# Patient Record
Sex: FEMALE | Race: WHITE | NOT HISPANIC OR LATINO
[De-identification: names, ages, dates, MRNs, and addresses within clinical notes are randomized per-mention and may not be internally consistent; named-entity substitution may affect disease eponyms.]

---

## 2017-03-29 ENCOUNTER — APPOINTMENT (OUTPATIENT)
Dept: DERMATOLOGY | Facility: CLINIC | Age: 62
End: 2017-03-29

## 2017-03-29 DIAGNOSIS — Z86.19 PERSONAL HISTORY OF OTHER INFECTIOUS AND PARASITIC DISEASES: ICD-10-CM

## 2017-03-29 DIAGNOSIS — Z78.9 OTHER SPECIFIED HEALTH STATUS: ICD-10-CM

## 2017-03-29 DIAGNOSIS — Z85.828 PERSONAL HISTORY OF OTHER MALIGNANT NEOPLASM OF SKIN: ICD-10-CM

## 2017-03-29 DIAGNOSIS — Z80.9 FAMILY HISTORY OF MALIGNANT NEOPLASM, UNSPECIFIED: ICD-10-CM

## 2017-03-29 DIAGNOSIS — L57.0 ACTINIC KERATOSIS: ICD-10-CM

## 2017-10-04 ENCOUNTER — APPOINTMENT (OUTPATIENT)
Dept: DERMATOLOGY | Facility: CLINIC | Age: 62
End: 2017-10-04

## 2018-01-18 ENCOUNTER — APPOINTMENT (OUTPATIENT)
Dept: DERMATOLOGY | Facility: CLINIC | Age: 63
End: 2018-01-18
Payer: COMMERCIAL

## 2018-01-18 DIAGNOSIS — L81.4 OTHER MELANIN HYPERPIGMENTATION: ICD-10-CM

## 2018-01-18 DIAGNOSIS — L82.0 INFLAMED SEBORRHEIC KERATOSIS: ICD-10-CM

## 2018-01-18 DIAGNOSIS — Z00.00 ENCOUNTER FOR GENERAL ADULT MEDICAL EXAMINATION W/OUT ABNORMAL FINDINGS: ICD-10-CM

## 2018-01-18 DIAGNOSIS — L82.1 OTHER SEBORRHEIC KERATOSIS: ICD-10-CM

## 2018-01-18 DIAGNOSIS — D18.01 HEMANGIOMA OF SKIN AND SUBCUTANEOUS TISSUE: ICD-10-CM

## 2018-01-18 PROCEDURE — 17110 DESTRUCTION B9 LES UP TO 14: CPT

## 2018-01-18 PROCEDURE — 99213 OFFICE O/P EST LOW 20 MIN: CPT | Mod: 25

## 2018-07-27 PROBLEM — Z78.9 ALCOHOL USE: Status: ACTIVE | Noted: 2017-03-29

## 2018-07-27 PROBLEM — Z85.828 HISTORY OF BASAL CELL CARCINOMA: Status: RESOLVED | Noted: 2017-03-29 | Resolved: 2018-07-27

## 2022-12-02 ENCOUNTER — OFFICE (OUTPATIENT)
Dept: URBAN - METROPOLITAN AREA CLINIC 109 | Facility: CLINIC | Age: 67
Setting detail: OPHTHALMOLOGY
End: 2022-12-02
Payer: MEDICARE

## 2022-12-02 DIAGNOSIS — H25.11: ICD-10-CM

## 2022-12-02 DIAGNOSIS — H25.13: ICD-10-CM

## 2022-12-02 PROCEDURE — 92136 OPHTHALMIC BIOMETRY: CPT | Performed by: OPHTHALMOLOGY

## 2022-12-02 PROCEDURE — 99213 OFFICE O/P EST LOW 20 MIN: CPT | Performed by: OPHTHALMOLOGY

## 2022-12-02 ASSESSMENT — KERATOMETRY
OD_K2POWER_DIOPTERS: 43.75
OD_AXISANGLE2_DEGREES: 134
OD_AXISANGLE_DEGREES: 74
OS_K2POWER_DIOPTERS: 43.50
OS_K1POWER_DIOPTERS: 42.87
OD_K1K2_AVERAGE: 42.75
OS_K1K2_AVERAGE: 43.185
OD_K1POWER_DIOPTERS: 43.00
OS_AXISANGLE_DEGREES: 002
OS_AXISANGLE2_DEGREES: 002
OS_K2POWER_DIOPTERS: 43.50
OD_CYLAXISANGLE_DEGREES: 134
OS_CYLAXISANGLE_DEGREES: 002
OD_K2POWER_DIOPTERS: 43.87
OD_CYLPOWER_DEGREES: 2
OD_K1POWER_DIOPTERS: 41.75
OS_AXISANGLE_DEGREES: 92
OS_K1POWER_DIOPTERS: 42.87
OS_CYLPOWER_DEGREES: 0.63
OD_AXISANGLE_DEGREES: 44

## 2022-12-02 ASSESSMENT — REFRACTION_AUTOREFRACTION
OD_SPHERE: -1.50
OS_CYLINDER: -0.25
OS_AXIS: 138
OD_CYLINDER: -0.50
OS_SPHERE: -0.25
OD_AXIS: 030

## 2022-12-02 ASSESSMENT — SPHEQUIV_DERIVED
OD_SPHEQUIV: -2.25
OD_SPHEQUIV: -1.75
OD_SPHEQUIV: -2.75
OS_SPHEQUIV: -0.875
OS_SPHEQUIV: -0.375

## 2022-12-02 ASSESSMENT — REFRACTION_CURRENTRX
OD_CYLINDER: -1.00
OD_SPHERE: -2.50
OD_AXIS: 155
OS_AXIS: 95
OD_OVR_VA: 20/
OS_SPHERE: -2.25
OS_CYLINDER: -0.25
OS_OVR_VA: 20/
OS_OVR_VA: 20/
OD_CYLINDER: -0.50
OS_AXIS: 125
OS_SPHERE: -1.50
OD_AXIS: 21
OS_CYLINDER: -0.75
OD_SPHERE: -1.25
OD_OVR_VA: 20/

## 2022-12-02 ASSESSMENT — REFRACTION_MANIFEST
OS_AXIS: 89
OS_SPHERE: -0.25
OS_CYLINDER: -1.25
OD_AXIS: 155
OD_CYLINDER: -0.50
OD_AXIS: 18
OD_SPHERE: -2.50
OD_VA1: 20/NI
OD_SPHERE: -2.00
OD_ADD: +2.50
OD_CYLINDER: -0.50
OS_ADD: +2.50

## 2022-12-02 ASSESSMENT — AXIALLENGTH_DERIVED
OD_AL: 25.0203
OS_AL: 24.0575
OD_AL: 24.8031
OD_AL: 24.5897
OS_AL: 23.8566

## 2022-12-02 ASSESSMENT — CONFRONTATIONAL VISUAL FIELD TEST (CVF)
OS_FINDINGS: FULL
OD_FINDINGS: FULL

## 2022-12-02 ASSESSMENT — VISUAL ACUITY
OD_BCVA: 20/20
OS_BCVA: 20/50-2

## 2022-12-02 ASSESSMENT — SUPERFICIAL PUNCTATE KERATITIS (SPK)
OS_SPK: 1+
OD_SPK: 1+

## 2022-12-02 ASSESSMENT — CORNEAL DYSTROPHY - POSTERIOR
OD_POSTERIORDYSTROPHY: PPD FUCHS
OS_POSTERIORDYSTROPHY: PPD FUCHS

## 2022-12-13 ENCOUNTER — AMBULATORY SURGERY CENTER (OUTPATIENT)
Dept: URBAN - METROPOLITAN AREA SURGERY 19 | Facility: SURGERY | Age: 67
Setting detail: OPHTHALMOLOGY
End: 2022-12-13
Payer: MEDICARE

## 2022-12-13 DIAGNOSIS — H52.221: ICD-10-CM

## 2022-12-13 DIAGNOSIS — H25.11: ICD-10-CM

## 2022-12-13 PROCEDURE — 66984 XCAPSL CTRC RMVL W/O ECP: CPT | Performed by: OPHTHALMOLOGY

## 2022-12-13 PROCEDURE — A9270 NON-COVERED ITEM OR SERVICE: HCPCS | Performed by: OPHTHALMOLOGY

## 2022-12-13 PROCEDURE — FEMTO CATARACT LASER: Performed by: OPHTHALMOLOGY

## 2022-12-14 ENCOUNTER — RX ONLY (RX ONLY)
Age: 67
End: 2022-12-14

## 2022-12-14 ENCOUNTER — OFFICE (OUTPATIENT)
Dept: URBAN - METROPOLITAN AREA CLINIC 109 | Facility: CLINIC | Age: 67
Setting detail: OPHTHALMOLOGY
End: 2022-12-14
Payer: MEDICARE

## 2022-12-14 DIAGNOSIS — Z96.1: ICD-10-CM

## 2022-12-14 PROCEDURE — 99024 POSTOP FOLLOW-UP VISIT: CPT | Performed by: OPHTHALMOLOGY

## 2022-12-14 ASSESSMENT — REFRACTION_CURRENTRX
OS_OVR_VA: 20/
OD_CYLINDER: -1.00
OS_AXIS: 125
OD_OVR_VA: 20/
OD_CYLINDER: -0.50
OD_AXIS: 155
OS_SPHERE: -2.25
OD_SPHERE: -1.25
OS_CYLINDER: -0.75
OS_OVR_VA: 20/
OS_AXIS: 95
OD_AXIS: 21
OS_SPHERE: -1.50
OD_OVR_VA: 20/
OS_CYLINDER: -0.25
OD_SPHERE: -2.50

## 2022-12-14 ASSESSMENT — REFRACTION_MANIFEST
OD_AXIS: 155
OD_VA1: 20/NI
OD_SPHERE: -2.50
OD_AXIS: 18
OS_SPHERE: -0.25
OD_SPHERE: -2.00
OS_CYLINDER: -1.25
OS_ADD: +2.50
OS_AXIS: 89
OD_ADD: +2.50
OD_CYLINDER: -0.50
OD_CYLINDER: -0.50

## 2022-12-14 ASSESSMENT — KERATOMETRY
OS_K1POWER_DIOPTERS: 42.87
OS_K2POWER_DIOPTERS: 43.50
OS_AXISANGLE_DEGREES: 002
OD_K2POWER_DIOPTERS: 43.87
OD_K1POWER_DIOPTERS: 43.00
OD_AXISANGLE_DEGREES: 74

## 2022-12-14 ASSESSMENT — REFRACTION_AUTOREFRACTION
OS_CYLINDER: -0.25
OD_AXIS: 030
OD_SPHERE: -1.50
OD_CYLINDER: -0.50
OS_SPHERE: -0.25
OS_AXIS: 138

## 2022-12-14 ASSESSMENT — SPHEQUIV_DERIVED
OS_SPHEQUIV: -0.375
OS_SPHEQUIV: -0.875
OD_SPHEQUIV: -2.25
OD_SPHEQUIV: -1.75
OD_SPHEQUIV: -2.75

## 2022-12-14 ASSESSMENT — VISUAL ACUITY
OD_BCVA: 20/20
OS_BCVA: 20/20-1

## 2022-12-14 ASSESSMENT — CONFRONTATIONAL VISUAL FIELD TEST (CVF)
OS_FINDINGS: FULL
OD_FINDINGS: FULL

## 2022-12-14 ASSESSMENT — SUPERFICIAL PUNCTATE KERATITIS (SPK)
OD_SPK: 1+
OS_SPK: 1+

## 2022-12-14 ASSESSMENT — AXIALLENGTH_DERIVED
OD_AL: 24.53
OD_AL: 24.74
OS_AL: 23.8566
OS_AL: 24.0575
OD_AL: 24.32

## 2023-01-09 ENCOUNTER — OFFICE (OUTPATIENT)
Dept: URBAN - METROPOLITAN AREA CLINIC 109 | Facility: CLINIC | Age: 68
Setting detail: OPHTHALMOLOGY
End: 2023-01-09
Payer: MEDICARE

## 2023-01-09 DIAGNOSIS — Z96.1: ICD-10-CM

## 2023-01-09 PROBLEM — H25.12 CATARACT SENILE NUCLEAR SCLEROSIS;  , LEFT EYE: Status: ACTIVE | Noted: 2022-12-14

## 2023-01-09 PROCEDURE — 99024 POSTOP FOLLOW-UP VISIT: CPT | Performed by: OPHTHALMOLOGY

## 2023-01-09 ASSESSMENT — KERATOMETRY
OS_K1POWER_DIOPTERS: 42.87
OD_AXISANGLE_DEGREES: 74
OS_AXISANGLE_DEGREES: 002
OD_K1POWER_DIOPTERS: 43.00
OD_K2POWER_DIOPTERS: 43.87
OS_K2POWER_DIOPTERS: 43.50

## 2023-01-09 ASSESSMENT — REFRACTION_CURRENTRX
OD_CYLINDER: -0.50
OD_AXIS: 21
OD_SPHERE: -2.50
OS_AXIS: 95
OS_OVR_VA: 20/
OD_AXIS: 155
OD_CYLINDER: -1.00
OD_OVR_VA: 20/
OS_CYLINDER: -0.25
OS_OVR_VA: 20/
OS_AXIS: 125
OS_CYLINDER: -0.75
OD_SPHERE: -1.25
OS_SPHERE: -1.50
OD_OVR_VA: 20/
OS_SPHERE: -2.25

## 2023-01-09 ASSESSMENT — REFRACTION_MANIFEST
OD_AXIS: 155
OS_ADD: +2.50
OS_AXIS: 89
OD_CYLINDER: -0.50
OD_SPHERE: -2.00
OD_CYLINDER: -0.50
OD_ADD: +2.50
OD_VA1: 20/NI
OS_SPHERE: -0.25
OS_CYLINDER: -1.25
OD_SPHERE: -2.50
OD_AXIS: 18

## 2023-01-09 ASSESSMENT — CONFRONTATIONAL VISUAL FIELD TEST (CVF)
OD_FINDINGS: FULL
OS_FINDINGS: FULL

## 2023-01-09 ASSESSMENT — SPHEQUIV_DERIVED
OS_SPHEQUIV: -1.125
OD_SPHEQUIV: -2.75
OD_SPHEQUIV: 0.125
OD_SPHEQUIV: -2.25
OS_SPHEQUIV: -0.875

## 2023-01-09 ASSESSMENT — SUPERFICIAL PUNCTATE KERATITIS (SPK)
OS_SPK: 1+
OD_SPK: 1+

## 2023-01-09 ASSESSMENT — REFRACTION_AUTOREFRACTION
OD_SPHERE: +0.25
OD_AXIS: 58
OS_SPHERE: -0.50
OS_CYLINDER: -1.25
OS_AXIS: 6
OD_CYLINDER: -0.25

## 2023-01-09 ASSESSMENT — VISUAL ACUITY
OS_BCVA: 20/20-1
OD_BCVA: 20/20-1

## 2023-01-09 ASSESSMENT — AXIALLENGTH_DERIVED
OS_AL: 24.1592
OS_AL: 24.0575
OD_AL: 24.74
OD_AL: 23.5671
OD_AL: 24.53

## 2023-04-10 ENCOUNTER — OFFICE (OUTPATIENT)
Dept: URBAN - METROPOLITAN AREA CLINIC 109 | Facility: CLINIC | Age: 68
Setting detail: OPHTHALMOLOGY
End: 2023-04-10
Payer: MEDICARE

## 2023-04-10 ENCOUNTER — RX ONLY (RX ONLY)
Age: 68
End: 2023-04-10

## 2023-04-10 DIAGNOSIS — H25.12: ICD-10-CM

## 2023-04-10 DIAGNOSIS — D31.32: ICD-10-CM

## 2023-04-10 DIAGNOSIS — H26.491: ICD-10-CM

## 2023-04-10 DIAGNOSIS — H47.322: ICD-10-CM

## 2023-04-10 DIAGNOSIS — H16.223: ICD-10-CM

## 2023-04-10 DIAGNOSIS — H35.54: ICD-10-CM

## 2023-04-10 DIAGNOSIS — H35.40: ICD-10-CM

## 2023-04-10 PROCEDURE — 99213 OFFICE O/P EST LOW 20 MIN: CPT | Performed by: OPHTHALMOLOGY

## 2023-04-10 ASSESSMENT — SPHEQUIV_DERIVED
OD_SPHEQUIV: -2.75
OS_SPHEQUIV: -0.25
OD_SPHEQUIV: -0.125
OD_SPHEQUIV: -2.25
OS_SPHEQUIV: -0.875

## 2023-04-10 ASSESSMENT — REFRACTION_MANIFEST
OS_CYLINDER: -1.25
OD_AXIS: 18
OS_ADD: +2.50
OS_SPHERE: -0.25
OD_AXIS: 155
OD_ADD: +2.50
OS_AXIS: 89
OD_CYLINDER: -0.50
OD_SPHERE: -2.50
OD_CYLINDER: -0.50
OD_VA1: 20/NI
OD_SPHERE: -2.00

## 2023-04-10 ASSESSMENT — REFRACTION_CURRENTRX
OS_OVR_VA: 20/
OS_CYLINDER: -0.25
OS_SPHERE: -2.25
OD_OVR_VA: 20/
OS_OVR_VA: 20/
OD_OVR_VA: 20/
OS_AXIS: 95
OS_SPHERE: -1.50
OD_CYLINDER: -0.50
OS_CYLINDER: -0.75
OD_CYLINDER: -1.00
OD_AXIS: 155
OD_SPHERE: -1.25
OD_AXIS: 21
OD_SPHERE: -2.50
OS_AXIS: 125

## 2023-04-10 ASSESSMENT — AXIALLENGTH_DERIVED
OD_AL: 24.53
OD_AL: 23.6647
OS_AL: 23.8069
OS_AL: 24.0575
OD_AL: 24.74

## 2023-04-10 ASSESSMENT — KERATOMETRY
OS_K1POWER_DIOPTERS: 42.87
OD_AXISANGLE_DEGREES: 74
OD_K2POWER_DIOPTERS: 43.87
OD_K1POWER_DIOPTERS: 43.00
OS_AXISANGLE_DEGREES: 002
OS_K2POWER_DIOPTERS: 43.50

## 2023-04-10 ASSESSMENT — CONFRONTATIONAL VISUAL FIELD TEST (CVF)
OS_FINDINGS: FULL
OD_FINDINGS: FULL

## 2023-04-10 ASSESSMENT — SUPERFICIAL PUNCTATE KERATITIS (SPK)
OD_SPK: 1+
OS_SPK: 1+

## 2023-04-10 ASSESSMENT — REFRACTION_AUTOREFRACTION
OS_AXIS: 47
OD_SPHERE: 0.00
OD_AXIS: 6
OD_CYLINDER: -0.25
OS_CYLINDER: -0.50
OS_SPHERE: 0.00

## 2023-04-10 ASSESSMENT — VISUAL ACUITY
OS_BCVA: 20/20
OD_BCVA: 20/20

## 2023-04-10 ASSESSMENT — TONOMETRY
OS_IOP_MMHG: 19
OD_IOP_MMHG: 19

## 2024-01-03 ENCOUNTER — INPATIENT (INPATIENT)
Facility: HOSPITAL | Age: 69
LOS: 0 days | Discharge: HOME | DRG: 395 | End: 2024-01-04
Attending: SURGERY | Admitting: SURGERY
Payer: COMMERCIAL

## 2024-01-03 VITALS
RESPIRATION RATE: 16 BRPM | HEIGHT: 62 IN | WEIGHT: 160.94 LBS | HEART RATE: 64 BPM | SYSTOLIC BLOOD PRESSURE: 149 MMHG | TEMPERATURE: 96 F | DIASTOLIC BLOOD PRESSURE: 107 MMHG | OXYGEN SATURATION: 97 %

## 2024-01-03 DIAGNOSIS — Z98.891 HISTORY OF UTERINE SCAR FROM PREVIOUS SURGERY: Chronic | ICD-10-CM

## 2024-01-03 DIAGNOSIS — K35.80 UNSPECIFIED ACUTE APPENDICITIS: ICD-10-CM

## 2024-01-03 LAB
ALBUMIN SERPL ELPH-MCNC: 4.3 G/DL — SIGNIFICANT CHANGE UP (ref 3.3–5)
ALBUMIN SERPL ELPH-MCNC: 4.3 G/DL — SIGNIFICANT CHANGE UP (ref 3.3–5)
ALP SERPL-CCNC: 76 U/L — SIGNIFICANT CHANGE UP (ref 40–120)
ALP SERPL-CCNC: 76 U/L — SIGNIFICANT CHANGE UP (ref 40–120)
ALT FLD-CCNC: 43 U/L — SIGNIFICANT CHANGE UP (ref 12–78)
ALT FLD-CCNC: 43 U/L — SIGNIFICANT CHANGE UP (ref 12–78)
ANION GAP SERPL CALC-SCNC: 9 MMOL/L — SIGNIFICANT CHANGE UP (ref 5–17)
ANION GAP SERPL CALC-SCNC: 9 MMOL/L — SIGNIFICANT CHANGE UP (ref 5–17)
AST SERPL-CCNC: 25 U/L — SIGNIFICANT CHANGE UP (ref 15–37)
AST SERPL-CCNC: 25 U/L — SIGNIFICANT CHANGE UP (ref 15–37)
BASOPHILS # BLD AUTO: 0.03 K/UL — SIGNIFICANT CHANGE UP (ref 0–0.2)
BASOPHILS # BLD AUTO: 0.03 K/UL — SIGNIFICANT CHANGE UP (ref 0–0.2)
BASOPHILS NFR BLD AUTO: 0.3 % — SIGNIFICANT CHANGE UP (ref 0–2)
BASOPHILS NFR BLD AUTO: 0.3 % — SIGNIFICANT CHANGE UP (ref 0–2)
BILIRUB SERPL-MCNC: 0.7 MG/DL — SIGNIFICANT CHANGE UP (ref 0.2–1.2)
BILIRUB SERPL-MCNC: 0.7 MG/DL — SIGNIFICANT CHANGE UP (ref 0.2–1.2)
BUN SERPL-MCNC: 9 MG/DL — SIGNIFICANT CHANGE UP (ref 7–23)
BUN SERPL-MCNC: 9 MG/DL — SIGNIFICANT CHANGE UP (ref 7–23)
CALCIUM SERPL-MCNC: 9 MG/DL — SIGNIFICANT CHANGE UP (ref 8.5–10.1)
CALCIUM SERPL-MCNC: 9 MG/DL — SIGNIFICANT CHANGE UP (ref 8.5–10.1)
CHLORIDE SERPL-SCNC: 101 MMOL/L — SIGNIFICANT CHANGE UP (ref 96–108)
CHLORIDE SERPL-SCNC: 101 MMOL/L — SIGNIFICANT CHANGE UP (ref 96–108)
CO2 SERPL-SCNC: 25 MMOL/L — SIGNIFICANT CHANGE UP (ref 22–31)
CO2 SERPL-SCNC: 25 MMOL/L — SIGNIFICANT CHANGE UP (ref 22–31)
CREAT SERPL-MCNC: 0.7 MG/DL — SIGNIFICANT CHANGE UP (ref 0.5–1.3)
CREAT SERPL-MCNC: 0.7 MG/DL — SIGNIFICANT CHANGE UP (ref 0.5–1.3)
EGFR: 95 ML/MIN/1.73M2 — SIGNIFICANT CHANGE UP
EGFR: 95 ML/MIN/1.73M2 — SIGNIFICANT CHANGE UP
EOSINOPHIL # BLD AUTO: 0.01 K/UL — SIGNIFICANT CHANGE UP (ref 0–0.5)
EOSINOPHIL # BLD AUTO: 0.01 K/UL — SIGNIFICANT CHANGE UP (ref 0–0.5)
EOSINOPHIL NFR BLD AUTO: 0.1 % — SIGNIFICANT CHANGE UP (ref 0–6)
EOSINOPHIL NFR BLD AUTO: 0.1 % — SIGNIFICANT CHANGE UP (ref 0–6)
GLUCOSE SERPL-MCNC: 155 MG/DL — HIGH (ref 70–99)
GLUCOSE SERPL-MCNC: 155 MG/DL — HIGH (ref 70–99)
HCT VFR BLD CALC: 52.4 % — HIGH (ref 34.5–45)
HCT VFR BLD CALC: 52.4 % — HIGH (ref 34.5–45)
HGB BLD-MCNC: 18.2 G/DL — HIGH (ref 11.5–15.5)
HGB BLD-MCNC: 18.2 G/DL — HIGH (ref 11.5–15.5)
IMM GRANULOCYTES NFR BLD AUTO: 0.4 % — SIGNIFICANT CHANGE UP (ref 0–0.9)
IMM GRANULOCYTES NFR BLD AUTO: 0.4 % — SIGNIFICANT CHANGE UP (ref 0–0.9)
LYMPHOCYTES # BLD AUTO: 0.5 K/UL — LOW (ref 1–3.3)
LYMPHOCYTES # BLD AUTO: 0.5 K/UL — LOW (ref 1–3.3)
LYMPHOCYTES # BLD AUTO: 5 % — LOW (ref 13–44)
LYMPHOCYTES # BLD AUTO: 5 % — LOW (ref 13–44)
MCHC RBC-ENTMCNC: 33 PG — SIGNIFICANT CHANGE UP (ref 27–34)
MCHC RBC-ENTMCNC: 33 PG — SIGNIFICANT CHANGE UP (ref 27–34)
MCHC RBC-ENTMCNC: 34.7 GM/DL — SIGNIFICANT CHANGE UP (ref 32–36)
MCHC RBC-ENTMCNC: 34.7 GM/DL — SIGNIFICANT CHANGE UP (ref 32–36)
MCV RBC AUTO: 95.1 FL — SIGNIFICANT CHANGE UP (ref 80–100)
MCV RBC AUTO: 95.1 FL — SIGNIFICANT CHANGE UP (ref 80–100)
MONOCYTES # BLD AUTO: 0.2 K/UL — SIGNIFICANT CHANGE UP (ref 0–0.9)
MONOCYTES # BLD AUTO: 0.2 K/UL — SIGNIFICANT CHANGE UP (ref 0–0.9)
MONOCYTES NFR BLD AUTO: 2 % — SIGNIFICANT CHANGE UP (ref 2–14)
MONOCYTES NFR BLD AUTO: 2 % — SIGNIFICANT CHANGE UP (ref 2–14)
NEUTROPHILS # BLD AUTO: 9.18 K/UL — HIGH (ref 1.8–7.4)
NEUTROPHILS # BLD AUTO: 9.18 K/UL — HIGH (ref 1.8–7.4)
NEUTROPHILS NFR BLD AUTO: 92.2 % — HIGH (ref 43–77)
NEUTROPHILS NFR BLD AUTO: 92.2 % — HIGH (ref 43–77)
NRBC # BLD: 0 /100 WBCS — SIGNIFICANT CHANGE UP (ref 0–0)
NRBC # BLD: 0 /100 WBCS — SIGNIFICANT CHANGE UP (ref 0–0)
PLATELET # BLD AUTO: 362 K/UL — SIGNIFICANT CHANGE UP (ref 150–400)
PLATELET # BLD AUTO: 362 K/UL — SIGNIFICANT CHANGE UP (ref 150–400)
POTASSIUM SERPL-MCNC: 3.8 MMOL/L — SIGNIFICANT CHANGE UP (ref 3.5–5.3)
POTASSIUM SERPL-MCNC: 3.8 MMOL/L — SIGNIFICANT CHANGE UP (ref 3.5–5.3)
POTASSIUM SERPL-SCNC: 3.8 MMOL/L — SIGNIFICANT CHANGE UP (ref 3.5–5.3)
POTASSIUM SERPL-SCNC: 3.8 MMOL/L — SIGNIFICANT CHANGE UP (ref 3.5–5.3)
PROT SERPL-MCNC: 8.9 G/DL — HIGH (ref 6–8.3)
PROT SERPL-MCNC: 8.9 G/DL — HIGH (ref 6–8.3)
RBC # BLD: 5.51 M/UL — HIGH (ref 3.8–5.2)
RBC # BLD: 5.51 M/UL — HIGH (ref 3.8–5.2)
RBC # FLD: 12.8 % — SIGNIFICANT CHANGE UP (ref 10.3–14.5)
RBC # FLD: 12.8 % — SIGNIFICANT CHANGE UP (ref 10.3–14.5)
SODIUM SERPL-SCNC: 135 MMOL/L — SIGNIFICANT CHANGE UP (ref 135–145)
SODIUM SERPL-SCNC: 135 MMOL/L — SIGNIFICANT CHANGE UP (ref 135–145)
WBC # BLD: 9.96 K/UL — SIGNIFICANT CHANGE UP (ref 3.8–10.5)
WBC # BLD: 9.96 K/UL — SIGNIFICANT CHANGE UP (ref 3.8–10.5)
WBC # FLD AUTO: 9.96 K/UL — SIGNIFICANT CHANGE UP (ref 3.8–10.5)
WBC # FLD AUTO: 9.96 K/UL — SIGNIFICANT CHANGE UP (ref 3.8–10.5)

## 2024-01-03 PROCEDURE — 99285 EMERGENCY DEPT VISIT HI MDM: CPT

## 2024-01-03 PROCEDURE — 71045 X-RAY EXAM CHEST 1 VIEW: CPT | Mod: 26

## 2024-01-03 PROCEDURE — 74177 CT ABD & PELVIS W/CONTRAST: CPT | Mod: 26,MA

## 2024-01-03 PROCEDURE — 99223 1ST HOSP IP/OBS HIGH 75: CPT

## 2024-01-03 RX ORDER — ONDANSETRON 8 MG/1
4 TABLET, FILM COATED ORAL ONCE
Refills: 0 | Status: COMPLETED | OUTPATIENT
Start: 2024-01-03 | End: 2024-01-03

## 2024-01-03 RX ORDER — SODIUM CHLORIDE 9 MG/ML
1000 INJECTION INTRAMUSCULAR; INTRAVENOUS; SUBCUTANEOUS
Refills: 0 | Status: DISCONTINUED | OUTPATIENT
Start: 2024-01-03 | End: 2024-01-04

## 2024-01-03 RX ORDER — LANOLIN ALCOHOL/MO/W.PET/CERES
5 CREAM (GRAM) TOPICAL AT BEDTIME
Refills: 0 | Status: DISCONTINUED | OUTPATIENT
Start: 2024-01-03 | End: 2024-01-04

## 2024-01-03 RX ORDER — SODIUM CHLORIDE 9 MG/ML
1000 INJECTION INTRAMUSCULAR; INTRAVENOUS; SUBCUTANEOUS ONCE
Refills: 0 | Status: COMPLETED | OUTPATIENT
Start: 2024-01-03 | End: 2024-01-03

## 2024-01-03 RX ORDER — AMLODIPINE BESYLATE 2.5 MG/1
10 TABLET ORAL DAILY
Refills: 0 | Status: DISCONTINUED | OUTPATIENT
Start: 2024-01-03 | End: 2024-01-04

## 2024-01-03 RX ORDER — PIPERACILLIN AND TAZOBACTAM 4; .5 G/20ML; G/20ML
3.38 INJECTION, POWDER, LYOPHILIZED, FOR SOLUTION INTRAVENOUS ONCE
Refills: 0 | Status: COMPLETED | OUTPATIENT
Start: 2024-01-03 | End: 2024-01-03

## 2024-01-03 RX ORDER — KETOROLAC TROMETHAMINE 30 MG/ML
15 SYRINGE (ML) INJECTION ONCE
Refills: 0 | Status: DISCONTINUED | OUTPATIENT
Start: 2024-01-03 | End: 2024-01-03

## 2024-01-03 RX ORDER — SENNA PLUS 8.6 MG/1
2 TABLET ORAL AT BEDTIME
Refills: 0 | Status: DISCONTINUED | OUTPATIENT
Start: 2024-01-03 | End: 2024-01-04

## 2024-01-03 RX ORDER — PANTOPRAZOLE SODIUM 20 MG/1
40 TABLET, DELAYED RELEASE ORAL DAILY
Refills: 0 | Status: DISCONTINUED | OUTPATIENT
Start: 2024-01-03 | End: 2024-01-04

## 2024-01-03 RX ORDER — ACETAMINOPHEN 500 MG
650 TABLET ORAL EVERY 6 HOURS
Refills: 0 | Status: DISCONTINUED | OUTPATIENT
Start: 2024-01-03 | End: 2024-01-04

## 2024-01-03 RX ORDER — ONDANSETRON 8 MG/1
4 TABLET, FILM COATED ORAL EVERY 6 HOURS
Refills: 0 | Status: DISCONTINUED | OUTPATIENT
Start: 2024-01-03 | End: 2024-01-04

## 2024-01-03 RX ORDER — ACETAMINOPHEN 500 MG
1000 TABLET ORAL ONCE
Refills: 0 | Status: DISCONTINUED | OUTPATIENT
Start: 2024-01-03 | End: 2024-01-04

## 2024-01-03 RX ADMIN — SODIUM CHLORIDE 1000 MILLILITER(S): 9 INJECTION INTRAMUSCULAR; INTRAVENOUS; SUBCUTANEOUS at 11:37

## 2024-01-03 RX ADMIN — PIPERACILLIN AND TAZOBACTAM 25 GRAM(S): 4; .5 INJECTION, POWDER, LYOPHILIZED, FOR SOLUTION INTRAVENOUS at 17:48

## 2024-01-03 RX ADMIN — Medication 15 MILLIGRAM(S): at 09:52

## 2024-01-03 RX ADMIN — PIPERACILLIN AND TAZOBACTAM 200 GRAM(S): 4; .5 INJECTION, POWDER, LYOPHILIZED, FOR SOLUTION INTRAVENOUS at 16:36

## 2024-01-03 RX ADMIN — SODIUM CHLORIDE 1000 MILLILITER(S): 9 INJECTION INTRAMUSCULAR; INTRAVENOUS; SUBCUTANEOUS at 12:15

## 2024-01-03 RX ADMIN — SODIUM CHLORIDE 100 MILLILITER(S): 9 INJECTION INTRAMUSCULAR; INTRAVENOUS; SUBCUTANEOUS at 17:47

## 2024-01-03 RX ADMIN — SODIUM CHLORIDE 1000 MILLILITER(S): 9 INJECTION INTRAMUSCULAR; INTRAVENOUS; SUBCUTANEOUS at 11:09

## 2024-01-03 RX ADMIN — ONDANSETRON 4 MILLIGRAM(S): 8 TABLET, FILM COATED ORAL at 09:52

## 2024-01-03 RX ADMIN — SODIUM CHLORIDE 1000 MILLILITER(S): 9 INJECTION INTRAMUSCULAR; INTRAVENOUS; SUBCUTANEOUS at 10:30

## 2024-01-03 RX ADMIN — Medication 15 MILLIGRAM(S): at 11:36

## 2024-01-03 NOTE — ED PROVIDER NOTE - NS ED MD DISPO DIVISION
"Last Written Prescription Date:  1/10/18  Last Fill Quantity: 30 tablet,  # refills: 3   Last office visit: 12/1/2017 with prescribing provider:  Michael   Future Office Visit:   Next 5 appointments (look out 90 days)     May 29, 2018 11:00 AM CDT   Return Visit with Susan Lindo MD   [facility] ([facility])    [facility]  [address]  [place] [address]   [phone]                 Requested Prescriptions   Pending Prescriptions Disp Refills     levothyroxine (SYNTHROID/LEVOTHROID) 75 MCG tablet 30 tablet 3     Sig: Take 1 tablet (75 mcg) by mouth daily Recheck TSH in 6 months    Thyroid Protocol Failed    5/26/2018 10:44 AM       Failed - Normal TSH on file in past 12 months    Recent Labs   Lab Test  03/22/18   0948   TSH  4.63*             Passed - Patient is 12 years or older       Passed - Recent (12 mo) or future (30 days) visit within the authorizing provider's specialty    Patient had office visit in the last 12 months or has a visit in the next 30 days with authorizing provider or within the authorizing provider's specialty.  See \"Patient Info\" tab in inbasket, or \"Choose Columns\" in Meds & Orders section of the refill encounter.           Passed - No active pregnancy on record    If patient is pregnant or has had a positive pregnancy test, please check TSH.         Passed - No positive pregnancy test in past 12 months    If patient is pregnant or has had a positive pregnancy test, please check TSH.            " [facility]

## 2024-01-03 NOTE — PATIENT PROFILE ADULT - STATED REASON FOR ADMISSION
[facility] -- Fifi Bliss, Kalpesh Valdovinos, Abraham Sheridan Savella, [place]  Office # [phone]    Follow Up:  SOB, Chest pain, Hx of Afib    Subjective/Observations: Awake and alert, comfortable on RA.  Denies chest pain, palpitations SOB, VALDEZ or orthopnea    REVIEW OF SYSTEMS: All other review of systems is negative unless indicated above  PAST MEDICAL & SURGICAL HISTORY:  HTN (hypertension)  c/b multiple episodes of hypertensive urgency    HLD (hyperlipidemia)    Atrial fibrillation  first documented on EKG 10/7/2021    CAD (coronary artery disease)  s/p stents (not on plavix)    Type 2 diabetes mellitus  not on home insulin/ Meds    Anxiety  Depression  multiple psych medications    History of diverticulitis  07/2021    Diverticulosis  c/b GIB in 2020    Afib  on AC    Stage 3 chronic kidney disease    Anemia of chronic disease  Monoclonal Gammopathy-MGUS  pRBC transfusion 10/15/21    Chronic diastolic congestive heart failure    Multiple thyroid nodules    Blood clots in brain  Had surgery ( April 2013 )    S/P tonsillectomy    S/P arthroscopic knee surgery  Bilateral ( 2005 )    Torsion of testicle  Had surgery at age 14    Pilonidal cyst  Had surgery ( 1969 )    S/P cataract surgery  Bilateral    H/O hernia repair  MEDICATIONS  (STANDING):  amLODIPine   Tablet 10 milliGRAM(s) Oral daily  apixaban 5 milliGRAM(s) Oral every 12 hours  ARIPiprazole 30 milliGRAM(s) Oral daily  aspirin enteric coated 81 milliGRAM(s) Oral daily  atorvastatin 10 milliGRAM(s) Oral at bedtime  budesonide 160 MICROgram(s)/formoterol 4.5 MICROgram(s) Inhaler 2 Puff(s) Inhalation two times a day  cloNIDine 0.2 milliGRAM(s) Oral every 12 hours  cyanocobalamin 1000 MICROGram(s) Oral daily  doxazosin 8 milliGRAM(s) Oral at bedtime  famotidine    Tablet 20 milliGRAM(s) Oral daily  folic acid 1 milliGRAM(s) Oral daily  furosemide    Tablet 60 milliGRAM(s) Oral two times a day  hydrALAZINE 100 milliGRAM(s) Oral three times a day  isosorbide   mononitrate ER Tablet (IMDUR) 60 milliGRAM(s) Oral daily  lamoTRIgine 100 milliGRAM(s) Oral daily  mirtazapine 30 milliGRAM(s) Oral at bedtime  venlafaxine XR. 150 milliGRAM(s) Oral daily    MEDICATIONS  (PRN):    Allergies    No Known Allergies    Intolerances    Vital Signs Last 24 Hrs  T(C): 36.6 (25 Oct 2021 05:12), Max: 36.8 (24 Oct 2021 11:40)  T(F): 97.9 (25 Oct 2021 05:12), Max: 98.3 (24 Oct 2021 11:40)  HR: 62 (25 Oct 2021 05:12) (54 - 74)  BP: 156/58 (25 Oct 2021 05:12) (156/58 - 171/82)  BP(mean): --  RR: 18 (25 Oct 2021 05:12) (18 - 18)  SpO2: 95% (25 Oct 2021 05:12) (94% - 95%)  I&O's Summary    24 Oct 2021 07:01  -  25 Oct 2021 07:00  --------------------------------------------------------  IN: 0 mL / OUT: 2400 mL / NET: -2400 mL      PHYSICAL EXAM:  TELE: Not on tele  Constitutional: NAD, awake and alert, obese  HEENT: Moist Mucous Membranes, Anicteric  Pulmonary: Non-labored, breath sounds are clear bilaterally, No wheezing, rales or rhonchi  Cardiovascular: IRRR, S1 and S2, +murmurs, no rubs, gallops or clicks  Gastrointestinal: Bowel Sounds present, soft, nontender.   Lymph: No peripheral edema. No lymphadenopathy.  Skin: No visible rashes or ulcers.  Psych:  Mood & affect appropriate  LABS: All Labs Reviewed:                        9.4    7.57  )-----------( 278      ( 25 Oct 2021 07:42 )             31.3                         8.8    7.20  )-----------( 257      ( 24 Oct 2021 10:04 )             29.6                         8.5    5.98  )-----------( 273      ( 23 Oct 2021 07:09 )             28.0     25 Oct 2021 07:42    142    |  108    |  41     ----------------------------<  112    3.5     |  26     |  2.30   24 Oct 2021 10:04    143    |  109    |  41     ----------------------------<  221    3.5     |  23     |  2.70   23 Oct 2021 07:09    143    |  112    |  39     ----------------------------<  101    3.8     |  24     |  2.40     Ca    9.7        25 Oct 2021 07:42  Ca    8.9        24 Oct 2021 10:04  Ca    8.8        23 Oct 2021 07:09    TPro  7.3    /  Alb  2.9    /  TBili  0.3    /  DBili  x      /  AST  17     /  ALT  22     /  AlkPhos  50     25 Oct 2021 07:42  TPro  7.2    /  Alb  2.7    /  TBili  0.2    /  DBili  x      /  AST  11     /  ALT  23     /  AlkPhos  50     24 Oct 2021 10:04  TPro  6.8    /  Alb  2.8    /  TBili  0.2    /  DBili  x      /  AST  15     /  ALT  20     /  AlkPhos  47     23 Oct 2021 07:09    CARDIAC MARKERS ( 23 Oct 2021 14:13 )  .022 ng/mL / x     / 40 U/L / x     / 1.4 ng/mL  CARDIAC MARKERS ( 23 Oct 2021 09:42 )  .034 ng/mL / x     / 44 U/L / x     / 1.6 ng/mL       EXAM:  ECHO TTE WO CON COMP W DOPP         PROCEDURE DATE:  10/13/2021        INTERPRETATION:  Ordering Physician: SKYLA TERRY [id number]  Indication: Cardiac arrhythmias.  Technician: INDIGO  Study Quality: Technical difficult study.  A complete echocardiographic study was performed utilizing standard protocol including spectral and color Doppler in all echocardiographic windows.  Height: 172cm  Weight: 102kg  BSA: 2.1m2  Blood Pressure: 150/90  MEASUREMENTS  IVS: 1.3cm  PWT: 1.3cm  LA: 4.2cm  AO: 3.5cm  LVIDd: 5.4 cm.  LVIDs: 3.5cm  LVEF: 55-60%.  PASP: 34mm Hg  FINDINGS  Left Ventricle: Normal in  size and normal LV systolic function with estimated LVEF 55-60%.  Right Ventricle: Normal in size and normal RV systolic function.  Left Atrium: Mild dilated.  Right Atrium: Normal in size.  Mitral Valve: Mild mitral annular calcification is present. Mitral valve is mildly calcified and no evidence of any mitral stenosis. Mild mitral regurgitation is present.  Aortic Valve: Aortic root is mildsclerotic and of normal dimension. Aortic valve is mildly calcified and mild  aortic stenosis is present with peak gradient across aortic valve is 30 mmHg. Aortic valve area not calculated.  Tricuspid Valve: Mild tricuspid regurgitation with calculated PA systolic pressure 34 mmHg is present. No evidence of any pulmonary hypertension  Pulmonic Valve: Trace Pulmonary regurgitation is present.  Diastolic Function: LV diastolic function cannot determine due to  underlying atrial fibrillation.  Pericardium/Pleura: No evidence of any pericardial effusion.  No intracardiac mass  thrombus or vegetations and  tumor.  Mild concentric left ventricular hypertrophy is present.  IMPRESSION:  Normal LV size with normal LV systolic function with estimated LVEF 55-60%.    LV diastolic function cannot determine due to atrial fibrillation.    Mild mitral regurgitation is present.    Mild aortic stenosis is  present.    Mild tricuspid regurgitation is present . No evidence of any pulmonary hypertension    Correlate clinically above findings.    --- End of Report ---      SKYLA TERRY MD; Attending Cardiologist  This document has been electronically signed. Oct 13 2021 11:55PM       EXAM:  CT CHEST                            PROCEDURE DATE:  10/18/2021          INTERPRETATION:  PROCEDURE INFORMATION:  Exam: CT Chest Without Contrast; Diagnostic  Exam date and time: 10/18/2021 7:20 PM  Age: 76 years old  Clinical indication: Shortness of breath    TECHNIQUE:  Imaging protocol: Diagnostic computed tomography of the chest without contrast.    COMPARISON:  CT CHEST 10/6/2021    FINDINGS:  Lungs: Multifocal bilateral pneumonia, organism nonspecific, does not have the characteristic appearance of COVID-19 but is within range of what can be seen with COVID-19 pneumonia.  Pleural spaces: Small bilateral pleural effusions.  Heart: Cardiomegaly.  Aorta: Atherosclerotic calcifications of thoracic aorta and coronary arteries. No aortic aneurysm.  Lymph nodes: Unremarkable. No enlarged lymph nodes.    Bones/joints: Degenerative changes. No acute fracture.  Soft tissues: Unremarkable.  Upper abdomen: Right renal cysts. Nonspecific left adrenal thickening. Colonic diverticulosis.    IMPRESSION:  1. Small bilateral pleural effusions.  2. Multifocal bilateral pneumonia, organism nonspecific, does not have the characteristic appearance of COVID-19 but is within range of what can be seen with COVID-19 pneumonia.    --- End of Report ---    RAINA DAO MD; Attending Radiologist  This document has been electronically signed. Oct 19 2021  8:32AM    Ventricular Rate 87 BPM    Atrial Rate 166 BPM    QRS Duration 110 ms    Q-T Interval 392 ms    QTC Calculation(Bazett) 471 ms    R Axis 4 degrees    T Axis 15 degrees    Diagnosis Line Atrial fibrillation  Nonspecific ST and T wave abnormality  Prolonged QT  Abnormal ECG  Confirmed by lynne Monterroso ([id number]) on 10/23/2021 3:39:52 PM   Pt states she came into ED because she was having RLQ Abd pain that she thought was appendicitis

## 2024-01-03 NOTE — ED PROVIDER NOTE - PHYSICAL EXAMINATION
Gen: alert, NAD  HEENT:  NC/AT, PERR  CV:  well perfused  Pulm:  normal RR, breathing comfortably  Abd: RLQ ttp w guarding and rebound  MSK: moving all extremities  Neuro:  non-focal  Skin:  visualized areas intact  Psych: AOx3

## 2024-01-03 NOTE — PATIENT PROFILE ADULT - FALL HARM RISK - UNIVERSAL INTERVENTIONS
Bed in lowest position, wheels locked, appropriate side rails in place/Call bell, personal items and telephone in reach/Instruct patient to call for assistance before getting out of bed or chair/Non-slip footwear when patient is out of bed/[place] to call system/Physically safe environment - no spills, clutter or unnecessary equipment/Purposeful Proactive Rounding/Room/bathroom lighting operational, light cord in reach Bed in lowest position, wheels locked, appropriate side rails in place/Call bell, personal items and telephone in reach/Instruct patient to call for assistance before getting out of bed or chair/Non-slip footwear when patient is out of bed/[place] to call system/Physically safe environment - no spills, clutter or unnecessary equipment/Purposeful Proactive Rounding/Room/bathroom lighting operational, light cord in reach

## 2024-01-03 NOTE — ED ADULT NURSE NOTE - NSFALLUNIVINTERV_ED_ALL_ED
Bed/Stretcher in lowest position, wheels locked, appropriate side rails in place/Call bell, personal items and telephone in reach/Instruct patient to call for assistance before getting out of bed/chair/stretcher/Non-slip footwear applied when patient is off stretcher/[place] to call system/Physically safe environment - no spills, clutter or unnecessary equipment/Purposeful proactive rounding/Room/bathroom lighting operational, light cord in reach Bed/Stretcher in lowest position, wheels locked, appropriate side rails in place/Call bell, personal items and telephone in reach/Instruct patient to call for assistance before getting out of bed/chair/stretcher/Non-slip footwear applied when patient is off stretcher/[place] to call system/Physically safe environment - no spills, clutter or unnecessary equipment/Purposeful proactive rounding/Room/bathroom lighting operational, light cord in reach

## 2024-01-03 NOTE — ED PROVIDER NOTE - OBJECTIVE STATEMENT
pt with diffuse abd pain yesterday that localized to the RLQ and now with persistent pain, had several episodes of vomiting. pt has h/o large fibroid but has not been painful. pt denies any fever. decreased appetite.

## 2024-01-03 NOTE — ED PROVIDER NOTE - CLINICAL SUMMARY MEDICAL DECISION MAKING FREE TEXT BOX
pt with RLQ pain, CT shows large uterine fibroid and dilated appendix, case dw Dr. Cuevas (surgery) admit to his service for acute appy.

## 2024-01-03 NOTE — H&P ADULT - HISTORY OF PRESENT ILLNESS
67 yo female presented to ED due to one day lower abdominal pain with N/V.   patient states she was in a USOH when she noted feeling "gassy",  tried moving her bowels with no success, patient then became  nauseous with few episodes of vomiting throughout the night combined with lower abdominal pain 8/10 which localized to her right lower abdomin as the night progressed. patient reports the pain was non radiating non positional with no alleviating or aggravating factors. patient with known large fibroid uterus  states that she has had mild discomfort from time to time due to positioning and size of her uterus but has not experienced such severity and intensity in her symptoms. no other complaints this time .no fever , chills , sick contact reported.  last normal BM yesterday, no urinary changes reported. surgical hysterectomy is scheduled in march of this year w her GYN . patient reports improvement of symptoms in ED.   < from: CT Abdomen and Pelvis w/ IV Cont (01.03.24 @ 10:30) >  1.  Large, heterogenous, cystic and solid appearing mass in the pelvis   measuring 10.5 x 15.5 x 13 cm,which appears to be originating from the   fundus of the uterus and demonstrates a well defined border.  Faintly   visualized endometrium appears grossly within normal limits.   Bilateral   ovaries are believed to be seen and appear to be within normal limits.    Differential includes large degenerating fibroid however malignant   etiologies are not excluded (ie uterine sarcoma or endometrial carcinoma   versus less likely ovarian malignancy). Recommend MRI of the pelvis with   IV contrast for further evaluation/better delineation of the ovaries,   myometrium and endometrium.    2.  The appendix is prominent, measuring up to 1.3 cm in diameter and   appears thick walled, with trace/equivocal periappendiceal inflammation;   finding is nonspecific and although could represent reactive change   related to large adjacent pelvic mass, the possibility of early,   uncomplicated appendicitis is not reliably excludable.    3.  There is a 3.1 x 1.7 x 1.7 cm hypodensity at the lower pole of the   left kidney with suggestion of a septation, not characterized on the   current exam but probably representing a septated cyst.  Can consider   initial characterization with ultrasound.  Alternatively, renal mass   protocol MRI or CT can be obtained for primary characterization.    4.  Cholelithiasis.    5.  There is a tiny, 1 -2 mm right breast calcification. Correlate with   dedicated breast imaging when clinically feasible.       69 yo female presented to ED due to one day lower abdominal pain with N/V.   patient states she was in a USOH when she noted feeling "gassy",  tried moving her bowels with no success, patient then became  nauseous with few episodes of vomiting throughout the night combined with lower abdominal pain 8/10 which localized to her right lower abdomin as the night progressed. patient reports the pain was non radiating non positional with no alleviating or aggravating factors. patient with known large fibroid uterus  states that she has had mild discomfort from time to time due to positioning and size of her uterus but has not experienced such severity and intensity in her symptoms. no other complaints this time .no fever , chills , sick contact reported.  last normal BM yesterday, no urinary changes reported. surgical hysterectomy is scheduled in march of this year w her GYN . patient reports improvement of symptoms in ED.   < from: CT Abdomen and Pelvis w/ IV Cont (01.03.24 @ 10:30) >  1.  Large, heterogenous, cystic and solid appearing mass in the pelvis   measuring 10.5 x 15.5 x 13 cm,which appears to be originating from the   fundus of the uterus and demonstrates a well defined border.  Faintly   visualized endometrium appears grossly within normal limits.   Bilateral   ovaries are believed to be seen and appear to be within normal limits.    Differential includes large degenerating fibroid however malignant   etiologies are not excluded (ie uterine sarcoma or endometrial carcinoma   versus less likely ovarian malignancy). Recommend MRI of the pelvis with   IV contrast for further evaluation/better delineation of the ovaries,   myometrium and endometrium.    2.  The appendix is prominent, measuring up to 1.3 cm in diameter and   appears thick walled, with trace/equivocal periappendiceal inflammation;   finding is nonspecific and although could represent reactive change   related to large adjacent pelvic mass, the possibility of early,   uncomplicated appendicitis is not reliably excludable.    3.  There is a 3.1 x 1.7 x 1.7 cm hypodensity at the lower pole of the   left kidney with suggestion of a septation, not characterized on the   current exam but probably representing a septated cyst.  Can consider   initial characterization with ultrasound.  Alternatively, renal mass   protocol MRI or CT can be obtained for primary characterization.    4.  Cholelithiasis.    5.  There is a tiny, 1 -2 mm right breast calcification. Correlate with   dedicated breast imaging when clinically feasible.

## 2024-01-03 NOTE — H&P ADULT - ASSESSMENT
76 yo female with large uterine fibroid presented w abdominal pain ? early appendicitis vs symptomatic uterine mass effect    plan: will admit to surgery for IV hydration and antibiotics.   obtain GYN consult   antibiotics vs surgical resection given the size of uterine fibroid and need for possible GYN intervention  78 yo female with large uterine fibroid presented w abdominal pain ? early appendicitis vs symptomatic uterine mass effect    plan: will admit to surgery for IV hydration and antibiotics.   obtain GYN consult  antibiotics vs surgical resection given the size of uterine fibroid and need for possible GYN intervention     As in above full PA notation.  Ms. Jo personally seen/ examined during evening rounds.  Denies pain at rest.  No further nausea or vomiting since admission.  Vitals remain non-suggestive.  Abdomen full/ obese/ distended, but soft.  Palpable uterus above level of umbilicus.  RLQ with +/- tenderness to deeper palpation without any guarding/ rebound/ referred pain.  Labs reassuring without leukocytosis or left shift.  CT scan and images personally reviewed with findings indeterminate for appendix changes versus reactive changes from large adjacent fibroid mass.  Clinically, improving overall since admission.  Surgically, stable for the present.  History c/w but not typical for acute appendicitis.  Examination without significant tenderness or any acute peritonitis.  Patient reports hysterectomy planning already underway/ in place with outside GYN in [place].  As such, for admission, observation, serial examination and follow-up labs.  We have discussed that her symptoms may or may not be specifically related to appendix.  We have discussed that appendectomy may or may not be straight forward given large uterine mass +/- fibroid.  We have discussed that if her condition improves, then supportive care would be appropriate and appendectomy should be performed at time of her pending hysterectomy.  We have discussed that if her condition does not continue to improve, then appendectomy would be recommended this admission.  GYN consultant aware and personally discussed with Dr. Lozada to be available as needed in OR should access to appendix prove non feasible or should hysterectomy be required based on operative findings.  Ms. Jo is pleased and agrees.  All questions answered.  Reviewed with patient in detail, GYN attending, Surgical Residents/ PA's as well as RN team from day and night shifts.  76 yo female with large uterine fibroid presented w abdominal pain ? early appendicitis vs symptomatic uterine mass effect    plan: will admit to surgery for IV hydration and antibiotics.   obtain GYN consult  antibiotics vs surgical resection given the size of uterine fibroid and need for possible GYN intervention     As in above full PA notation.  Ms. Jo personally seen/ examined during evening rounds.  Denies pain at rest.  No further nausea or vomiting since admission.  Vitals remain non-suggestive.  Abdomen full/ obese/ distended, but soft.  Palpable uterus above level of umbilicus.  RLQ with +/- tenderness to deeper palpation without any guarding/ rebound/ referred pain.  Labs reassuring without leukocytosis or left shift.  CT scan and images personally reviewed with findings indeterminate for appendix changes versus reactive changes from large adjacent fibroid mass.  Clinically, improving overall since admission.  Surgically, stable for the present.  History c/w but not typical for acute appendicitis.  Examination without significant tenderness or any acute peritonitis.  Patient reports hysterectomy planning already underway/ in place with outside GYN in [place].  As such, for admission, observation, serial examination and follow-up labs.  We have discussed that her symptoms may or may not be specifically related to appendix.  We have discussed that appendectomy may or may not be straight forward given large uterine mass +/- fibroid.  We have discussed that if her condition improves, then supportive care would be appropriate and appendectomy should be performed at time of her pending hysterectomy.  We have discussed that if her condition does not continue to improve, then appendectomy would be recommended this admission.  GYN consultant aware and personally discussed with Dr. Lozada to be available as needed in OR should access to appendix prove non feasible or should hysterectomy be required based on operative findings.  Ms. Jo is pleased and agrees.  All questions answered.  Reviewed with patient in detail, GYN attending, Surgical Residents/ PA's as well as RN team from day and night shifts.

## 2024-01-03 NOTE — H&P ADULT - TIME BILLING
Reviewed with patient in detail, GYN attending, Surgical Residents/ PA's as well as RN team from day and night shifts.

## 2024-01-03 NOTE — ED ADULT TRIAGE NOTE - CHIEF COMPLAINT QUOTE
Sudden onset RLQ pain started approx 7pm yesterday; pt ate dinner then started vomiting, increased frequency in urination

## 2024-01-04 ENCOUNTER — TRANSCRIPTION ENCOUNTER (OUTPATIENT)
Age: 69
End: 2024-01-04

## 2024-01-04 VITALS
SYSTOLIC BLOOD PRESSURE: 135 MMHG | HEART RATE: 72 BPM | OXYGEN SATURATION: 96 % | TEMPERATURE: 98 F | RESPIRATION RATE: 18 BRPM | DIASTOLIC BLOOD PRESSURE: 83 MMHG

## 2024-01-04 DIAGNOSIS — R10.9 UNSPECIFIED ABDOMINAL PAIN: ICD-10-CM

## 2024-01-04 DIAGNOSIS — R19.00 INTRA-ABDOMINAL AND PELVIC SWELLING, MASS AND LUMP, UNSPECIFIED SITE: ICD-10-CM

## 2024-01-04 DIAGNOSIS — R93.89 ABNORMAL FINDINGS ON DIAGNOSTIC IMAGING OF OTHER SPECIFIED BODY STRUCTURES: ICD-10-CM

## 2024-01-04 DIAGNOSIS — K35.80 UNSPECIFIED ACUTE APPENDICITIS: ICD-10-CM

## 2024-01-04 DIAGNOSIS — I44.7 LEFT BUNDLE-BRANCH BLOCK, UNSPECIFIED: ICD-10-CM

## 2024-01-04 DIAGNOSIS — K80.20 CALCULUS OF GALLBLADDER WITHOUT CHOLECYSTITIS WITHOUT OBSTRUCTION: ICD-10-CM

## 2024-01-04 DIAGNOSIS — I10 ESSENTIAL (PRIMARY) HYPERTENSION: ICD-10-CM

## 2024-01-04 LAB
ANION GAP SERPL CALC-SCNC: 1 MMOL/L — LOW (ref 5–17)
ANION GAP SERPL CALC-SCNC: 1 MMOL/L — LOW (ref 5–17)
APTT BLD: 29.1 SEC — SIGNIFICANT CHANGE UP (ref 24.5–35.6)
APTT BLD: 29.1 SEC — SIGNIFICANT CHANGE UP (ref 24.5–35.6)
BASOPHILS # BLD AUTO: 0.05 K/UL — SIGNIFICANT CHANGE UP (ref 0–0.2)
BASOPHILS # BLD AUTO: 0.05 K/UL — SIGNIFICANT CHANGE UP (ref 0–0.2)
BASOPHILS NFR BLD AUTO: 0.8 % — SIGNIFICANT CHANGE UP (ref 0–2)
BASOPHILS NFR BLD AUTO: 0.8 % — SIGNIFICANT CHANGE UP (ref 0–2)
BUN SERPL-MCNC: 8 MG/DL — SIGNIFICANT CHANGE UP (ref 7–23)
BUN SERPL-MCNC: 8 MG/DL — SIGNIFICANT CHANGE UP (ref 7–23)
CALCIUM SERPL-MCNC: 8.1 MG/DL — LOW (ref 8.5–10.1)
CALCIUM SERPL-MCNC: 8.1 MG/DL — LOW (ref 8.5–10.1)
CHLORIDE SERPL-SCNC: 113 MMOL/L — HIGH (ref 96–108)
CHLORIDE SERPL-SCNC: 113 MMOL/L — HIGH (ref 96–108)
CO2 SERPL-SCNC: 27 MMOL/L — SIGNIFICANT CHANGE UP (ref 22–31)
CO2 SERPL-SCNC: 27 MMOL/L — SIGNIFICANT CHANGE UP (ref 22–31)
CREAT SERPL-MCNC: 0.62 MG/DL — SIGNIFICANT CHANGE UP (ref 0.5–1.3)
CREAT SERPL-MCNC: 0.62 MG/DL — SIGNIFICANT CHANGE UP (ref 0.5–1.3)
EGFR: 98 ML/MIN/1.73M2 — SIGNIFICANT CHANGE UP
EGFR: 98 ML/MIN/1.73M2 — SIGNIFICANT CHANGE UP
EOSINOPHIL # BLD AUTO: 0.05 K/UL — SIGNIFICANT CHANGE UP (ref 0–0.5)
EOSINOPHIL # BLD AUTO: 0.05 K/UL — SIGNIFICANT CHANGE UP (ref 0–0.5)
EOSINOPHIL NFR BLD AUTO: 0.8 % — SIGNIFICANT CHANGE UP (ref 0–6)
EOSINOPHIL NFR BLD AUTO: 0.8 % — SIGNIFICANT CHANGE UP (ref 0–6)
GLUCOSE SERPL-MCNC: 104 MG/DL — HIGH (ref 70–99)
GLUCOSE SERPL-MCNC: 104 MG/DL — HIGH (ref 70–99)
HCT VFR BLD CALC: 41.7 % — SIGNIFICANT CHANGE UP (ref 34.5–45)
HCT VFR BLD CALC: 41.7 % — SIGNIFICANT CHANGE UP (ref 34.5–45)
HCT VFR BLD CALC: 43.3 % — SIGNIFICANT CHANGE UP (ref 34.5–45)
HCT VFR BLD CALC: 43.3 % — SIGNIFICANT CHANGE UP (ref 34.5–45)
HCV AB S/CO SERPL IA: 0.13 S/CO — SIGNIFICANT CHANGE UP (ref 0–0.99)
HCV AB S/CO SERPL IA: 0.13 S/CO — SIGNIFICANT CHANGE UP (ref 0–0.99)
HCV AB SERPL-IMP: SIGNIFICANT CHANGE UP
HCV AB SERPL-IMP: SIGNIFICANT CHANGE UP
HGB BLD-MCNC: 14.5 G/DL — SIGNIFICANT CHANGE UP (ref 11.5–15.5)
HGB BLD-MCNC: 14.5 G/DL — SIGNIFICANT CHANGE UP (ref 11.5–15.5)
HGB BLD-MCNC: 15.1 G/DL — SIGNIFICANT CHANGE UP (ref 11.5–15.5)
HGB BLD-MCNC: 15.1 G/DL — SIGNIFICANT CHANGE UP (ref 11.5–15.5)
IMM GRANULOCYTES NFR BLD AUTO: 0.3 % — SIGNIFICANT CHANGE UP (ref 0–0.9)
IMM GRANULOCYTES NFR BLD AUTO: 0.3 % — SIGNIFICANT CHANGE UP (ref 0–0.9)
INR BLD: 0.99 RATIO — SIGNIFICANT CHANGE UP (ref 0.85–1.18)
INR BLD: 0.99 RATIO — SIGNIFICANT CHANGE UP (ref 0.85–1.18)
LYMPHOCYTES # BLD AUTO: 1.63 K/UL — SIGNIFICANT CHANGE UP (ref 1–3.3)
LYMPHOCYTES # BLD AUTO: 1.63 K/UL — SIGNIFICANT CHANGE UP (ref 1–3.3)
LYMPHOCYTES # BLD AUTO: 26.2 % — SIGNIFICANT CHANGE UP (ref 13–44)
LYMPHOCYTES # BLD AUTO: 26.2 % — SIGNIFICANT CHANGE UP (ref 13–44)
MAGNESIUM SERPL-MCNC: 2.1 MG/DL — SIGNIFICANT CHANGE UP (ref 1.6–2.6)
MAGNESIUM SERPL-MCNC: 2.1 MG/DL — SIGNIFICANT CHANGE UP (ref 1.6–2.6)
MAGNESIUM SERPL-MCNC: 2.2 MG/DL — SIGNIFICANT CHANGE UP (ref 1.6–2.6)
MAGNESIUM SERPL-MCNC: 2.2 MG/DL — SIGNIFICANT CHANGE UP (ref 1.6–2.6)
MCHC RBC-ENTMCNC: 33.3 PG — SIGNIFICANT CHANGE UP (ref 27–34)
MCHC RBC-ENTMCNC: 34.8 GM/DL — SIGNIFICANT CHANGE UP (ref 32–36)
MCHC RBC-ENTMCNC: 34.8 GM/DL — SIGNIFICANT CHANGE UP (ref 32–36)
MCHC RBC-ENTMCNC: 34.9 GM/DL — SIGNIFICANT CHANGE UP (ref 32–36)
MCHC RBC-ENTMCNC: 34.9 GM/DL — SIGNIFICANT CHANGE UP (ref 32–36)
MCV RBC AUTO: 95.4 FL — SIGNIFICANT CHANGE UP (ref 80–100)
MCV RBC AUTO: 95.4 FL — SIGNIFICANT CHANGE UP (ref 80–100)
MCV RBC AUTO: 95.6 FL — SIGNIFICANT CHANGE UP (ref 80–100)
MCV RBC AUTO: 95.6 FL — SIGNIFICANT CHANGE UP (ref 80–100)
MONOCYTES # BLD AUTO: 0.47 K/UL — SIGNIFICANT CHANGE UP (ref 0–0.9)
MONOCYTES # BLD AUTO: 0.47 K/UL — SIGNIFICANT CHANGE UP (ref 0–0.9)
MONOCYTES NFR BLD AUTO: 7.5 % — SIGNIFICANT CHANGE UP (ref 2–14)
MONOCYTES NFR BLD AUTO: 7.5 % — SIGNIFICANT CHANGE UP (ref 2–14)
NEUTROPHILS # BLD AUTO: 4.01 K/UL — SIGNIFICANT CHANGE UP (ref 1.8–7.4)
NEUTROPHILS # BLD AUTO: 4.01 K/UL — SIGNIFICANT CHANGE UP (ref 1.8–7.4)
NEUTROPHILS NFR BLD AUTO: 64.4 % — SIGNIFICANT CHANGE UP (ref 43–77)
NEUTROPHILS NFR BLD AUTO: 64.4 % — SIGNIFICANT CHANGE UP (ref 43–77)
NRBC # BLD: 0 /100 WBCS — SIGNIFICANT CHANGE UP (ref 0–0)
PHOSPHATE SERPL-MCNC: 2.3 MG/DL — LOW (ref 2.5–4.5)
PHOSPHATE SERPL-MCNC: 2.3 MG/DL — LOW (ref 2.5–4.5)
PLATELET # BLD AUTO: 321 K/UL — SIGNIFICANT CHANGE UP (ref 150–400)
PLATELET # BLD AUTO: 321 K/UL — SIGNIFICANT CHANGE UP (ref 150–400)
PLATELET # BLD AUTO: 332 K/UL — SIGNIFICANT CHANGE UP (ref 150–400)
PLATELET # BLD AUTO: 332 K/UL — SIGNIFICANT CHANGE UP (ref 150–400)
POTASSIUM SERPL-MCNC: 3.1 MMOL/L — LOW (ref 3.5–5.3)
POTASSIUM SERPL-MCNC: 3.1 MMOL/L — LOW (ref 3.5–5.3)
POTASSIUM SERPL-SCNC: 3.1 MMOL/L — LOW (ref 3.5–5.3)
POTASSIUM SERPL-SCNC: 3.1 MMOL/L — LOW (ref 3.5–5.3)
PROTHROM AB SERPL-ACNC: 11.6 SEC — SIGNIFICANT CHANGE UP (ref 9.5–13)
PROTHROM AB SERPL-ACNC: 11.6 SEC — SIGNIFICANT CHANGE UP (ref 9.5–13)
RBC # BLD: 4.36 M/UL — SIGNIFICANT CHANGE UP (ref 3.8–5.2)
RBC # BLD: 4.36 M/UL — SIGNIFICANT CHANGE UP (ref 3.8–5.2)
RBC # BLD: 4.54 M/UL — SIGNIFICANT CHANGE UP (ref 3.8–5.2)
RBC # BLD: 4.54 M/UL — SIGNIFICANT CHANGE UP (ref 3.8–5.2)
RBC # FLD: 13 % — SIGNIFICANT CHANGE UP (ref 10.3–14.5)
RBC # FLD: 13 % — SIGNIFICANT CHANGE UP (ref 10.3–14.5)
RBC # FLD: 13.1 % — SIGNIFICANT CHANGE UP (ref 10.3–14.5)
RBC # FLD: 13.1 % — SIGNIFICANT CHANGE UP (ref 10.3–14.5)
SODIUM SERPL-SCNC: 141 MMOL/L — SIGNIFICANT CHANGE UP (ref 135–145)
SODIUM SERPL-SCNC: 141 MMOL/L — SIGNIFICANT CHANGE UP (ref 135–145)
WBC # BLD: 5.31 K/UL — SIGNIFICANT CHANGE UP (ref 3.8–10.5)
WBC # BLD: 5.31 K/UL — SIGNIFICANT CHANGE UP (ref 3.8–10.5)
WBC # BLD: 6.06 K/UL — SIGNIFICANT CHANGE UP (ref 3.8–10.5)
WBC # BLD: 6.06 K/UL — SIGNIFICANT CHANGE UP (ref 3.8–10.5)
WBC # FLD AUTO: 5.31 K/UL — SIGNIFICANT CHANGE UP (ref 3.8–10.5)
WBC # FLD AUTO: 5.31 K/UL — SIGNIFICANT CHANGE UP (ref 3.8–10.5)
WBC # FLD AUTO: 6.06 K/UL — SIGNIFICANT CHANGE UP (ref 3.8–10.5)
WBC # FLD AUTO: 6.06 K/UL — SIGNIFICANT CHANGE UP (ref 3.8–10.5)

## 2024-01-04 PROCEDURE — 80048 BASIC METABOLIC PNL TOTAL CA: CPT

## 2024-01-04 PROCEDURE — 83735 ASSAY OF MAGNESIUM: CPT

## 2024-01-04 PROCEDURE — 85610 PROTHROMBIN TIME: CPT

## 2024-01-04 PROCEDURE — 86803 HEPATITIS C AB TEST: CPT

## 2024-01-04 PROCEDURE — 99285 EMERGENCY DEPT VISIT HI MDM: CPT | Mod: 25

## 2024-01-04 PROCEDURE — 74177 CT ABD & PELVIS W/CONTRAST: CPT | Mod: MA

## 2024-01-04 PROCEDURE — 96375 TX/PRO/DX INJ NEW DRUG ADDON: CPT

## 2024-01-04 PROCEDURE — 86850 RBC ANTIBODY SCREEN: CPT

## 2024-01-04 PROCEDURE — 93005 ELECTROCARDIOGRAM TRACING: CPT

## 2024-01-04 PROCEDURE — 96374 THER/PROPH/DIAG INJ IV PUSH: CPT

## 2024-01-04 PROCEDURE — 86901 BLOOD TYPING SEROLOGIC RH(D): CPT

## 2024-01-04 PROCEDURE — 71045 X-RAY EXAM CHEST 1 VIEW: CPT

## 2024-01-04 PROCEDURE — 85730 THROMBOPLASTIN TIME PARTIAL: CPT

## 2024-01-04 PROCEDURE — 36415 COLL VENOUS BLD VENIPUNCTURE: CPT

## 2024-01-04 PROCEDURE — 99239 HOSP IP/OBS DSCHRG MGMT >30: CPT

## 2024-01-04 PROCEDURE — 85027 COMPLETE CBC AUTOMATED: CPT

## 2024-01-04 PROCEDURE — 80053 COMPREHEN METABOLIC PANEL: CPT

## 2024-01-04 PROCEDURE — 99233 SBSQ HOSP IP/OBS HIGH 50: CPT

## 2024-01-04 PROCEDURE — 84100 ASSAY OF PHOSPHORUS: CPT

## 2024-01-04 PROCEDURE — 85025 COMPLETE CBC W/AUTO DIFF WBC: CPT

## 2024-01-04 PROCEDURE — 99222 1ST HOSP IP/OBS MODERATE 55: CPT

## 2024-01-04 PROCEDURE — 86900 BLOOD TYPING SEROLOGIC ABO: CPT

## 2024-01-04 RX ORDER — METRONIDAZOLE 500 MG
1 TABLET ORAL
Qty: 21 | Refills: 0
Start: 2024-01-04 | End: 2024-01-10

## 2024-01-04 RX ORDER — AMLODIPINE BESYLATE 2.5 MG/1
10 TABLET ORAL DAILY
Refills: 0 | Status: DISCONTINUED | OUTPATIENT
Start: 2024-01-04 | End: 2024-01-04

## 2024-01-04 RX ORDER — POTASSIUM CHLORIDE 20 MEQ
10 PACKET (EA) ORAL
Refills: 0 | Status: COMPLETED | OUTPATIENT
Start: 2024-01-04 | End: 2024-01-04

## 2024-01-04 RX ORDER — AMLODIPINE BESYLATE 2.5 MG/1
1 TABLET ORAL
Qty: 0 | Refills: 0 | DISCHARGE
Start: 2024-01-04

## 2024-01-04 RX ORDER — ENOXAPARIN SODIUM 100 MG/ML
40 INJECTION SUBCUTANEOUS EVERY 24 HOURS
Refills: 0 | Status: DISCONTINUED | OUTPATIENT
Start: 2024-01-04 | End: 2024-01-04

## 2024-01-04 RX ORDER — CEFPODOXIME PROXETIL 100 MG
1 TABLET ORAL
Qty: 14 | Refills: 0
Start: 2024-01-04 | End: 2024-01-10

## 2024-01-04 RX ORDER — POTASSIUM PHOSPHATE, MONOBASIC POTASSIUM PHOSPHATE, DIBASIC 236; 224 MG/ML; MG/ML
15 INJECTION, SOLUTION INTRAVENOUS ONCE
Refills: 0 | Status: DISCONTINUED | OUTPATIENT
Start: 2024-01-04 | End: 2024-01-04

## 2024-01-04 RX ORDER — PIPERACILLIN AND TAZOBACTAM 4; .5 G/20ML; G/20ML
3.38 INJECTION, POWDER, LYOPHILIZED, FOR SOLUTION INTRAVENOUS EVERY 8 HOURS
Refills: 0 | Status: DISCONTINUED | OUTPATIENT
Start: 2024-01-04 | End: 2024-01-04

## 2024-01-04 RX ADMIN — PANTOPRAZOLE SODIUM 40 MILLIGRAM(S): 20 TABLET, DELAYED RELEASE ORAL at 11:19

## 2024-01-04 RX ADMIN — Medication 100 MILLIEQUIVALENT(S): at 12:26

## 2024-01-04 RX ADMIN — PIPERACILLIN AND TAZOBACTAM 25 GRAM(S): 4; .5 INJECTION, POWDER, LYOPHILIZED, FOR SOLUTION INTRAVENOUS at 06:30

## 2024-01-04 RX ADMIN — Medication 100 MILLIEQUIVALENT(S): at 11:15

## 2024-01-04 RX ADMIN — PIPERACILLIN AND TAZOBACTAM 25 GRAM(S): 4; .5 INJECTION, POWDER, LYOPHILIZED, FOR SOLUTION INTRAVENOUS at 13:37

## 2024-01-04 RX ADMIN — AMLODIPINE BESYLATE 10 MILLIGRAM(S): 2.5 TABLET ORAL at 06:29

## 2024-01-04 RX ADMIN — SODIUM CHLORIDE 100 MILLILITER(S): 9 INJECTION INTRAMUSCULAR; INTRAVENOUS; SUBCUTANEOUS at 06:30

## 2024-01-04 NOTE — DISCHARGE NOTE NURSING/CASE MANAGEMENT/SOCIAL WORK - PATIENT PORTAL LINK FT
You can access the FollowMyHealth Patient Portal offered by [facility] by registering at the following website: [email or web address]. By joining [facility]’s FollowMyHealth portal, you will also be able to view your health information using other applications (apps) compatible with our system. You can access the FollowMyHealth Patient Portal offered by [facility] by registering at the following website: [email or web address]. By joining [facility]’s FollowMyHealth portal, you will also be able to view your health information using other applications (apps) compatible with our system.

## 2024-01-04 NOTE — DISCHARGE NOTE PROVIDER - NSDCFUADDINST_GEN_ALL_CORE_FT
We have sent prescriptions for two antibiotics. Please take those as prescribed.  You may take Tylenol for any intermittent pain you experience.

## 2024-01-04 NOTE — PROGRESS NOTE ADULT - PROBLEM SELECTOR PLAN 2
As above
CT : The appendix is prominent, measuring up to 1.3 cm in diameter and   CT abd with contrast : appears thick walled, with trace/equivocal periappendiceal inflammation;   finding is nonspecific and although could represent reactive change   related to large adjacent pelvic mass, the possibility of early, uncomplicated appendicitis is not reliably excludable.   afebrile   wbc WNL  on zosyn   surgery eval noted  patient has new LBBB, called cardio for pre op cardio eval   potassium and phosphate supplement   patient is otherwise medically optimized for surgery. RCRI score 0, Mets>4

## 2024-01-04 NOTE — DISCHARGE NOTE NURSING/CASE MANAGEMENT/SOCIAL WORK - NSDCPEFALRISK_GEN_ALL_CORE
For information on Fall & Injury Prevention, visit: [email or web address] OR  [email or web address] OR  https://www.cdc.gov/steadi/patient.html For information on Fall & Injury Prevention, visit: [email or web address] OR  [email or web address] OR  https://www.cdc.gov/steadi/patient.html

## 2024-01-04 NOTE — DISCHARGE NOTE PROVIDER - CARE PROVIDERS DIRECT ADDRESSES
,[email or web address]

## 2024-01-04 NOTE — CONSULT NOTE ADULT - ASSESSMENT
69 yo female presented to ED due to one day lower abdominal pain with N/V and admitted to surgery for management of appendicitis vs uterine mass affect.    Plan:  - Patient has no cardiac c/o at this time.   -EKG: NSR w. frequent PVCs. Possible LA enlargement. LBBB    - No meaningful evidence of volume overload.  -Pt has no TTE on file, F/U TTE    - BP well controlled, systolics ranging 130-140s.  monitor routine hemodynamics.  - Continue home amlodipine 10mg qd.      - Monitor and replete lytes, keep K>4, Mg>2.    - Pt has no active ischemia, decompensated heart failure, unstable arrythmia, or severe stenotic valvular disease, and has __ cardiac risk factors. In the setting of low risk ___, he/she is optimized from cardiovascular standpoint to proceed with planned procedure with routine hemodynamic monitoring.   - Pt has no modifiable active cardiac risk factors and in the setting of low risk _____, patient is optimized as best as possible from cardiovascular standpoint to proceed with planned procedure with routine hemodynamic monitoring.  - Other cardiovascular workup will depend on clinical course.  - All other workup per primary team.  - Will continue to follow.             67 yo female presented to ED due to one day lower abdominal pain with N/V and admitted to surgery for management of appendicitis vs uterine mass affect.    Plan:  - Patient has no cardiac c/o at this time.   -EKG: NSR w. frequent PVCs. Possible LA enlargement. LBBB    - No meaningful evidence of volume overload.  -Pt has no TTE on file, F/U TTE    - BP well controlled, systolics ranging 130-140s.  monitor routine hemodynamics.  - Continue home amlodipine 10mg qd.      - Monitor and replete lytes, keep K>4, Mg>2.    - Pt has no active ischemia, decompensated heart failure, unstable arrythmia, or severe stenotic valvular disease, and has __ cardiac risk factors. In the setting of low risk ___, he/she is optimized from cardiovascular standpoint to proceed with planned procedure with routine hemodynamic monitoring.   - Pt has no modifiable active cardiac risk factors and in the setting of low risk _____, patient is optimized as best as possible from cardiovascular standpoint to proceed with planned procedure with routine hemodynamic monitoring.  - Other cardiovascular workup will depend on clinical course.  - All other workup per primary team.  - Will continue to follow.             69 yo female presented to ED due to one day lower abdominal pain with N/V and admitted to surgery for management of appendicitis vs uterine mass affect.    Plan:  - Patient has no cardiac c/o at this time.   -EKG: NSR w. frequent PVCs. Possible LA enlargement. LBBB    - No meaningful evidence of volume overload.  -Pt has no TTE on file, F/U TTE    - BP well controlled, systolics ranging 130-140s. monitor routine hemodynamics.  - Continue home amlodipine 10mg qd.    - Monitor and replete lytes, keep K>4, Mg>2.  -K of 3.1, Phos 2.3 -> repletion ordered    - Pt has no modifiable active cardiac risk factors and in the setting of low risk surgery., patient is optimized as best as possible from cardiovascular standpoint to proceed with planned procedure with routine hemodynamic monitoring.  -RCRI score of 0    - Other cardiovascular workup will depend on clinical course.  - All other workup per primary team.  - Will continue to follow.             67 yo female presented to ED due to one day lower abdominal pain with N/V and admitted to surgery for management of appendicitis vs uterine mass affect.    Plan:  - Patient has no cardiac c/o at this time.   -EKG: NSR w. frequent PVCs. Possible LA enlargement. LBBB    - No meaningful evidence of volume overload.  -Pt has no TTE on file, F/U TTE    - BP well controlled, systolics ranging 130-140s. monitor routine hemodynamics.  - Continue home amlodipine 10mg qd.    - Monitor and replete lytes, keep K>4, Mg>2.  -K of 3.1, Phos 2.3 -> repletion ordered    - Pt has no modifiable active cardiac risk factors and in the setting of low risk surgery., patient is optimized as best as possible from cardiovascular standpoint to proceed with planned procedure with routine hemodynamic monitoring.  -RCRI score of 0    - Other cardiovascular workup will depend on clinical course.  - All other workup per primary team.  - Will continue to follow.

## 2024-01-04 NOTE — DISCHARGE NOTE PROVIDER - HOSPITAL COURSE
HPI: 67 yo female presented to ED due to one day lower abdominal pain with N/V.   patient states she was in a USOH when she noted feeling "gassy",  tried moving her bowels with no success, patient then became  nauseous with few episodes of vomiting throughout the night combined with lower abdominal pain 8/10 which localized to her right lower abdomin as the night progressed. patient reports the pain was non radiating non positional with no alleviating or aggravating factors. patient with known large fibroid uterus  states that she has had mild discomfort from time to time due to positioning and size of her uterus but has not experienced such severity and intensity in her symptoms. no other complaints this time .no fever , chills , sick contact reported.  last normal BM yesterday, no urinary changes reported. surgical hysterectomy is scheduled in march of this year w her GYN . patient reports improvement of symptoms in ED.   < from: CT Abdomen and Pelvis w/ IV Cont (01.03.24 @ 10:30) >  1.  Large, heterogenous, cystic and solid appearing mass in the pelvis   measuring 10.5 x 15.5 x 13 cm,which appears to be originating from the   fundus of the uterus and demonstrates a well defined border.  Faintly   visualized endometrium appears grossly within normal limits.   Bilateral   ovaries are believed to be seen and appear to be within normal limits.    Differential includes large degenerating fibroid however malignant   etiologies are not excluded (ie uterine sarcoma or endometrial carcinoma   versus less likely ovarian malignancy). Recommend MRI of the pelvis with   IV contrast for further evaluation/better delineation of the ovaries,   myometrium and endometrium.  2.  The appendix is prominent, measuring up to 1.3 cm in diameter and   appears thick walled, with trace/equivocal periappendiceal inflammation;   finding is nonspecific and although could represent reactive change   related to large adjacent pelvic mass, the possibility of early,   uncomplicated appendicitis is not reliably excludable.  3.  There is a 3.1 x 1.7 x 1.7 cm hypodensity at the lower pole of the   left kidney with suggestion of a septation, not characterized on the   current exam but probably representing a septated cyst.  Can consider   initial characterization with ultrasound.  Alternatively, renal mass   protocol MRI or CT can be obtained for primary characterization.  4.  Cholelithiasis.  5.  There is a tiny, 1 -2 mm right breast calcification. Correlate with   dedicated breast imaging when clinically feasible.   (03 Jan 2024 14:35)    Hospital Course:  Patient managed conservatively with IV abx.  Consulted GYN: Dr. Lozada    Throughout hospital stay, patient was continued on antibiotics, pain was managed adequately.   Diet was advanced appropriately until return of normal GI function was obtained as evidence by passing of flatus and BM in addition to toleration of diet.  Lab values were monitored, electrolytes were repleted as indicated.    Dispo: discharge home, to follow up with Dr. Cuevas outpatient in 2 weeks. Pt also to follow up with her GYN for elective hysterectomy.   HPI: 69 yo female presented to ED due to one day lower abdominal pain with N/V.   patient states she was in a USOH when she noted feeling "gassy",  tried moving her bowels with no success, patient then became  nauseous with few episodes of vomiting throughout the night combined with lower abdominal pain 8/10 which localized to her right lower abdomin as the night progressed. patient reports the pain was non radiating non positional with no alleviating or aggravating factors. patient with known large fibroid uterus  states that she has had mild discomfort from time to time due to positioning and size of her uterus but has not experienced such severity and intensity in her symptoms. no other complaints this time .no fever , chills , sick contact reported.  last normal BM yesterday, no urinary changes reported. surgical hysterectomy is scheduled in march of this year w her GYN . patient reports improvement of symptoms in ED.   < from: CT Abdomen and Pelvis w/ IV Cont (01.03.24 @ 10:30) >  1.  Large, heterogenous, cystic and solid appearing mass in the pelvis   measuring 10.5 x 15.5 x 13 cm,which appears to be originating from the   fundus of the uterus and demonstrates a well defined border.  Faintly   visualized endometrium appears grossly within normal limits.   Bilateral   ovaries are believed to be seen and appear to be within normal limits.    Differential includes large degenerating fibroid however malignant   etiologies are not excluded (ie uterine sarcoma or endometrial carcinoma   versus less likely ovarian malignancy). Recommend MRI of the pelvis with   IV contrast for further evaluation/better delineation of the ovaries,   myometrium and endometrium.  2.  The appendix is prominent, measuring up to 1.3 cm in diameter and   appears thick walled, with trace/equivocal periappendiceal inflammation;   finding is nonspecific and although could represent reactive change   related to large adjacent pelvic mass, the possibility of early,   uncomplicated appendicitis is not reliably excludable.  3.  There is a 3.1 x 1.7 x 1.7 cm hypodensity at the lower pole of the   left kidney with suggestion of a septation, not characterized on the   current exam but probably representing a septated cyst.  Can consider   initial characterization with ultrasound.  Alternatively, renal mass   protocol MRI or CT can be obtained for primary characterization.  4.  Cholelithiasis.  5.  There is a tiny, 1 -2 mm right breast calcification. Correlate with   dedicated breast imaging when clinically feasible.   (03 Jan 2024 14:35)    Hospital Course:  Patient managed conservatively with IV abx.  Consulted GYN: Dr. Lozada    Throughout hospital stay, patient was continued on antibiotics, pain was managed adequately.   Diet was advanced appropriately until return of normal GI function was obtained as evidence by passing of flatus and BM in addition to toleration of diet.  Lab values were monitored, electrolytes were repleted as indicated.    Dispo: discharge home, to follow up with Dr. Cuevas outpatient in 2 weeks. Pt also to follow up with her GYN for elective hysterectomy.

## 2024-01-04 NOTE — CONSULT NOTE ADULT - SUBJECTIVE AND OBJECTIVE BOX
69 y/o female with PMH of HTN, uterine fibroid scheduled to undergo sx in march of this year who presented to ED to be evaluated for nausea,vomiting associated with RLQ pain for a day. CT abd/pel indicative of early developing appendicitis. EKG indicative of LBBB, Pt denies any prior hx of CAD or stenting. we do not have any prior EKG to compare. pt mentions that her last EKG was at [facility] in 2017-1018 she was asked to see cardiologist for "some rythm issues" pt did F/u with cardiologist at [facility] but cardiologist "wasnt too concern with EKG"     PE:    T(C): 36.7 (01-03-24 @ 21:24), Max: 36.7 (01-03-24 @ 13:03)  HR: 96 (01-03-24 @ 21:24) (64 - 96)  BP: 134/77 (01-03-24 @ 21:24) (134/77 - 149/107)  RR: 18 (01-03-24 @ 21:24) (16 - 18)  SpO2: 96% (01-03-24 @ 21:24) (95% - 97%)    CONSTITUTIONAL: Well groomed, no apparent distress  EYES: PERRLA and symmetric, EOMI, No conjunctival or scleral injection, non-icteric  ENMT: Oral mucosa with moist membranes. Normal dentition; no pharyngeal injection or exudate  RESP: No respiratory distress, no use of accessory muscles; CTA b/l, no WRR  CV: RRR, +S1S2, no MRG; no JVD; no peripheral edema  GI: Soft, TTP in RLQ, Pos BS   LYMPH: No cervical LAD or tenderness; no axillary LAD or tenderness; no inguinal LAD or tenderness  MSK: Normal gait; No digital clubbing or cyanosis; examination of the (head/neck/spine/ribs/pelvis, RUE, LUE, RLE, LLE) without misalignment,   NEURO: CN II-XII intact; normal reflexes in upper and lower extremities, sensation intact in upper and lower extremities b/l to light touch   PSYCH: Appropriate insight/judgment; A+O x 3, mood and affect appropriate, recent/remote memory intact 67 y/o female with PMH of HTN, uterine fibroid scheduled to undergo sx in march of this year who presented to ED to be evaluated for nausea,vomiting associated with RLQ pain for a day. CT abd/pel indicative of early developing appendicitis. EKG indicative of LBBB, Pt denies any prior hx of CAD or stenting. we do not have any prior EKG to compare. pt mentions that her last EKG was at [facility] in 2017-1018 she was asked to see cardiologist for "some rythm issues" pt did F/u with cardiologist at [facility] but cardiologist "wasnt too concern with EKG"     PE:    T(C): 36.7 (01-03-24 @ 21:24), Max: 36.7 (01-03-24 @ 13:03)  HR: 96 (01-03-24 @ 21:24) (64 - 96)  BP: 134/77 (01-03-24 @ 21:24) (134/77 - 149/107)  RR: 18 (01-03-24 @ 21:24) (16 - 18)  SpO2: 96% (01-03-24 @ 21:24) (95% - 97%)    CONSTITUTIONAL: Well groomed, no apparent distress  EYES: PERRLA and symmetric, EOMI, No conjunctival or scleral injection, non-icteric  ENMT: Oral mucosa with moist membranes. Normal dentition; no pharyngeal injection or exudate  RESP: No respiratory distress, no use of accessory muscles; CTA b/l, no WRR  CV: RRR, +S1S2, no MRG; no JVD; no peripheral edema  GI: Soft, TTP in RLQ, Pos BS   LYMPH: No cervical LAD or tenderness; no axillary LAD or tenderness; no inguinal LAD or tenderness  MSK: Normal gait; No digital clubbing or cyanosis; examination of the (head/neck/spine/ribs/pelvis, RUE, LUE, RLE, LLE) without misalignment,   NEURO: CN II-XII intact; normal reflexes in upper and lower extremities, sensation intact in upper and lower extremities b/l to light touch   PSYCH: Appropriate insight/judgment; A+O x 3, mood and affect appropriate, recent/remote memory intact

## 2024-01-04 NOTE — CARE COORDINATION ASSESSMENT. - CURRENT MENTAL STATUS/COGNITIVE FUNCTIONING
alert/oriented to person/oriented to place/oriented to time/oriented to situation/recall memory is intact/recent memory is intact/behavior seems appropriate to situation

## 2024-01-04 NOTE — PROGRESS NOTE ADULT - PROBLEM SELECTOR PLAN 3
CT 1.  Large, heterogenous, cystic and solid appearing mass in the pelvis   measuring 10.5 x 15.5 x 13 cm,which appears to be originating from the   fundus of the uterus and demonstrates a well defined border.  Faintly   visualized endometrium appears grossly within normal limits.   Bilateral   ovaries are believed to be seen and appear to be within normal limits.    Differential includes large degenerating fibroid however malignant   etiologies are not excluded (ie uterine sarcoma or endometrial carcinoma   versus less likely ovarian malignancy). Recommend MRI of the pelvis with   IV contrast for further evaluation/better delineation of the ovaries, myometrium and endometrium.    patient has known h/o large uterine fibroid, she states she had GYN visit in Nov , surgery was recommended but she want to think about it.   GYN eval

## 2024-01-04 NOTE — CONSULT NOTE ADULT - SUBJECTIVE AND OBJECTIVE BOX
[facility]    Edwin Cesar, Conrad, Julissa, Rahul, Edgar, Yonny       [phone] (office)    Reason for Consult:    Interval HPI: Patient seen and examined at bedside. Pt states her pain is much improved and is currently asymptomatic.     HPI:  69 yo female presented to ED due to one day lower abdominal pain with N/V.   patient states she was in a USOH when she noted feeling "gassy",  tried moving her bowels with no success, patient then became  nauseous with few episodes of vomiting throughout the night combined with lower abdominal pain 8/10 which localized to her right lower abdomin as the night progressed. patient reports the pain was non radiating non positional with no alleviating or aggravating factors. patient with known large fibroid uterus  states that she has had mild discomfort from time to time due to positioning and size of her uterus but has not experienced such severity and intensity in her symptoms. no other complaints this time .no fever , chills , sick contact reported.  last normal BM yesterday, no urinary changes reported. surgical hysterectomy is scheduled in march of this year w her GYN . patient reports improvement of symptoms in ED.   < from: CT Abdomen and Pelvis w/ IV Cont (24 @ 10:30) >  1.  Large, heterogenous, cystic and solid appearing mass in the pelvis   measuring 10.5 x 15.5 x 13 cm,which appears to be originating from the   fundus of the uterus and demonstrates a well defined border.  Faintly   visualized endometrium appears grossly within normal limits.   Bilateral   ovaries are believed to be seen and appear to be within normal limits.    Differential includes large degenerating fibroid however malignant   etiologies are not excluded (ie uterine sarcoma or endometrial carcinoma   versus less likely ovarian malignancy). Recommend MRI of the pelvis with   IV contrast for further evaluation/better delineation of the ovaries,   myometrium and endometrium.    2.  The appendix is prominent, measuring up to 1.3 cm in diameter and   appears thick walled, with trace/equivocal periappendiceal inflammation;   finding is nonspecific and although could represent reactive change   related to large adjacent pelvic mass, the possibility of early,   uncomplicated appendicitis is not reliably excludable.    3.  There is a 3.1 x 1.7 x 1.7 cm hypodensity at the lower pole of the   left kidney with suggestion of a septation, not characterized on the   current exam but probably representing a septated cyst.  Can consider   initial characterization with ultrasound.  Alternatively, renal mass   protocol MRI or CT can be obtained for primary characterization.    4.  Cholelithiasis.    5.  There is a tiny, 1 -2 mm right breast calcification. Correlate with   dedicated breast imaging when clinically feasible.       (2024 14:35)      PAST MEDICAL & SURGICAL HISTORY:  HTN (hypertension)      History of  section          SOCIAL HISTORY: No active tobacco, alcohol or illicit drug use    FAMILY HISTORY:      Home Medications:      MEDICATIONS  (STANDING):  amLODIPine   Tablet 10 milliGRAM(s) Oral daily  pantoprazole  Injectable 40 milliGRAM(s) IV Push daily  piperacillin/tazobactam IVPB.. 3.375 Gram(s) IV Intermittent every 8 hours  potassium chloride  10 mEq/100 mL IVPB 10 milliEquivalent(s) IV Intermittent every 1 hour  potassium phosphate IVPB 15 milliMole(s) IV Intermittent once  senna 2 Tablet(s) Oral at bedtime  sodium chloride 0.9%. 1000 milliLiter(s) (100 mL/Hr) IV Continuous <Continuous>    MEDICATIONS  (PRN):  acetaminophen     Tablet .. 650 milliGRAM(s) Oral every 6 hours PRN Temp greater or equal to 38C (100.4F), Mild Pain (1 - 3)  acetaminophen   IVPB .. 1000 milliGRAM(s) IV Intermittent once PRN Moderate Pain (4 - 6)  melatonin 5 milliGRAM(s) Oral at bedtime PRN Insomnia      Allergies    No Known Allergies    Intolerances        REVIEW OF SYSTEMS: Negative except as per HPI.    VITAL SIGNS:   Vital Signs Last 24 Hrs  T(C): 36.7 (2024 05:17), Max: 36.7 (2024 13:03)  T(F): 98 (2024 05:17), Max: 98.1 (2024 21:24)  HR: 74 (2024 05:17) (66 - 96)  BP: 145/84 (2024 05:17) (134/77 - 145/84)  BP(mean): --  RR: 19 (2024 05:17) (17 - 19)  SpO2: 95% (2024 05:17) (95% - 97%)    Parameters below as of 2024 05:17  Patient On (Oxygen Delivery Method): nasal cannula  O2 Flow (L/min): 2      I&O's Summary    2024 07:01  -  2024 07:00  --------------------------------------------------------  IN: 250 mL / OUT: 300 mL / NET: -50 mL        PHYSICAL EXAM:  Constitutional: NAD, well-developed  HEENT NC/AT, moist mucous membranes  Pulmonary: Non-labored, breath sounds are clear bilaterally, no wheezing, rales or rhonchi  Cardiovascular: +S1, S2, RRR, no murmur  Gastrointestinal: Soft, nontender, nondistended, normoactive bowel sounds  Extremities: No peripheral edema   Neurological: Alert, strength and sensitivity are grossly intact  Skin: No obvious lesions/rashes  Psych: Mood & affect appropriate    LABS: All Labs Reviewed:                        14.5   6.06  )-----------( 321      ( 2024 05:07 )             41.7                         18.2   9.96  )-----------( 362      ( 2024 09:41 )             52.4     2024 05:07    141    |  113    |  8      ----------------------------<  104    3.1     |  27     |  0.62   2024 09:41    135    |  101    |  9      ----------------------------<  155    3.8     |  25     |  0.70     Ca    8.1        2024 05:07  Ca    9.0        2024 09:41  Phos  2.3       2024 05:07  Mg     2.1       2024 05:07    TPro  8.9    /  Alb  4.3    /  TBili  0.7    /  DBili  x      /  AST  25     /  ALT  43     /  AlkPhos  76     2024 09:41    PT/INR - ( 2024 05:07 )   PT: 11.6 sec;   INR: 0.99 ratio         PTT - ( 2024 05:07 )  PTT:29.1 sec      Blood Culture:         EKG:    RADIOLOGY:    CXR:   [facility]    Edwin Cesar, Conrad, Julissa, Rahul, Edgar, Yonny       [phone] (office)    Reason for Consult:    Interval HPI: Patient seen and examined at bedside. Pt states her pain is much improved and is currently asymptomatic.     HPI:  67 yo female presented to ED due to one day lower abdominal pain with N/V.   patient states she was in a USOH when she noted feeling "gassy",  tried moving her bowels with no success, patient then became  nauseous with few episodes of vomiting throughout the night combined with lower abdominal pain 8/10 which localized to her right lower abdomin as the night progressed. patient reports the pain was non radiating non positional with no alleviating or aggravating factors. patient with known large fibroid uterus  states that she has had mild discomfort from time to time due to positioning and size of her uterus but has not experienced such severity and intensity in her symptoms. no other complaints this time .no fever , chills , sick contact reported.  last normal BM yesterday, no urinary changes reported. surgical hysterectomy is scheduled in march of this year w her GYN . patient reports improvement of symptoms in ED.   < from: CT Abdomen and Pelvis w/ IV Cont (24 @ 10:30) >  1.  Large, heterogenous, cystic and solid appearing mass in the pelvis   measuring 10.5 x 15.5 x 13 cm,which appears to be originating from the   fundus of the uterus and demonstrates a well defined border.  Faintly   visualized endometrium appears grossly within normal limits.   Bilateral   ovaries are believed to be seen and appear to be within normal limits.    Differential includes large degenerating fibroid however malignant   etiologies are not excluded (ie uterine sarcoma or endometrial carcinoma   versus less likely ovarian malignancy). Recommend MRI of the pelvis with   IV contrast for further evaluation/better delineation of the ovaries,   myometrium and endometrium.    2.  The appendix is prominent, measuring up to 1.3 cm in diameter and   appears thick walled, with trace/equivocal periappendiceal inflammation;   finding is nonspecific and although could represent reactive change   related to large adjacent pelvic mass, the possibility of early,   uncomplicated appendicitis is not reliably excludable.    3.  There is a 3.1 x 1.7 x 1.7 cm hypodensity at the lower pole of the   left kidney with suggestion of a septation, not characterized on the   current exam but probably representing a septated cyst.  Can consider   initial characterization with ultrasound.  Alternatively, renal mass   protocol MRI or CT can be obtained for primary characterization.    4.  Cholelithiasis.    5.  There is a tiny, 1 -2 mm right breast calcification. Correlate with   dedicated breast imaging when clinically feasible.       (2024 14:35)      PAST MEDICAL & SURGICAL HISTORY:  HTN (hypertension)      History of  section          SOCIAL HISTORY: No active tobacco, alcohol or illicit drug use    FAMILY HISTORY:      Home Medications:      MEDICATIONS  (STANDING):  amLODIPine   Tablet 10 milliGRAM(s) Oral daily  pantoprazole  Injectable 40 milliGRAM(s) IV Push daily  piperacillin/tazobactam IVPB.. 3.375 Gram(s) IV Intermittent every 8 hours  potassium chloride  10 mEq/100 mL IVPB 10 milliEquivalent(s) IV Intermittent every 1 hour  potassium phosphate IVPB 15 milliMole(s) IV Intermittent once  senna 2 Tablet(s) Oral at bedtime  sodium chloride 0.9%. 1000 milliLiter(s) (100 mL/Hr) IV Continuous <Continuous>    MEDICATIONS  (PRN):  acetaminophen     Tablet .. 650 milliGRAM(s) Oral every 6 hours PRN Temp greater or equal to 38C (100.4F), Mild Pain (1 - 3)  acetaminophen   IVPB .. 1000 milliGRAM(s) IV Intermittent once PRN Moderate Pain (4 - 6)  melatonin 5 milliGRAM(s) Oral at bedtime PRN Insomnia      Allergies    No Known Allergies    Intolerances        REVIEW OF SYSTEMS: Negative except as per HPI.    VITAL SIGNS:   Vital Signs Last 24 Hrs  T(C): 36.7 (2024 05:17), Max: 36.7 (2024 13:03)  T(F): 98 (2024 05:17), Max: 98.1 (2024 21:24)  HR: 74 (2024 05:17) (66 - 96)  BP: 145/84 (2024 05:17) (134/77 - 145/84)  BP(mean): --  RR: 19 (2024 05:17) (17 - 19)  SpO2: 95% (2024 05:17) (95% - 97%)    Parameters below as of 2024 05:17  Patient On (Oxygen Delivery Method): nasal cannula  O2 Flow (L/min): 2      I&O's Summary    2024 07:01  -  2024 07:00  --------------------------------------------------------  IN: 250 mL / OUT: 300 mL / NET: -50 mL        PHYSICAL EXAM:  Constitutional: NAD, well-developed  HEENT NC/AT, moist mucous membranes  Pulmonary: Non-labored, breath sounds are clear bilaterally, no wheezing, rales or rhonchi  Cardiovascular: +S1, S2, RRR, no murmur  Gastrointestinal: Soft, nontender, nondistended, normoactive bowel sounds  Extremities: No peripheral edema   Neurological: Alert, strength and sensitivity are grossly intact  Skin: No obvious lesions/rashes  Psych: Mood & affect appropriate    LABS: All Labs Reviewed:                        14.5   6.06  )-----------( 321      ( 2024 05:07 )             41.7                         18.2   9.96  )-----------( 362      ( 2024 09:41 )             52.4     2024 05:07    141    |  113    |  8      ----------------------------<  104    3.1     |  27     |  0.62   2024 09:41    135    |  101    |  9      ----------------------------<  155    3.8     |  25     |  0.70     Ca    8.1        2024 05:07  Ca    9.0        2024 09:41  Phos  2.3       2024 05:07  Mg     2.1       2024 05:07    TPro  8.9    /  Alb  4.3    /  TBili  0.7    /  DBili  x      /  AST  25     /  ALT  43     /  AlkPhos  76     2024 09:41    PT/INR - ( 2024 05:07 )   PT: 11.6 sec;   INR: 0.99 ratio         PTT - ( 2024 05:07 )  PTT:29.1 sec      Blood Culture:         EKG:    RADIOLOGY:    CXR:   [facility]    Edwin Cesar, Conrad, Julissa, Rahul, Edgar, Yonny       [phone] (office)    Reason for Consult:    Interval HPI: Patient seen and examined at bedside. Pt states her pain is much improved and is currently asymptomatic. She states she had a history of abnormal EKG in 2017 with her PCP. She f/u with Cardiologist and a stress test was done with negative result. Pt has not done a TTE. Pt only PMHx is HTN and currently on Amlodipine 10mg. Tolerates well w/o any lower ext edema.     HPI:  67 yo female presented to ED due to one day lower abdominal pain with N/V.   patient states she was in a USOH when she noted feeling "gassy",  tried moving her bowels with no success, patient then became  nauseous with few episodes of vomiting throughout the night combined with lower abdominal pain 8/10 which localized to her right lower abdomin as the night progressed. patient reports the pain was non radiating non positional with no alleviating or aggravating factors. patient with known large fibroid uterus  states that she has had mild discomfort from time to time due to positioning and size of her uterus but has not experienced such severity and intensity in her symptoms. no other complaints this time .no fever , chills , sick contact reported.  last normal BM yesterday, no urinary changes reported. surgical hysterectomy is scheduled in march of this year w her GYN . patient reports improvement of symptoms in ED.   < from: CT Abdomen and Pelvis w/ IV Cont (24 @ 10:30) >  1.  Large, heterogenous, cystic and solid appearing mass in the pelvis   measuring 10.5 x 15.5 x 13 cm,which appears to be originating from the   fundus of the uterus and demonstrates a well defined border.  Faintly   visualized endometrium appears grossly within normal limits.   Bilateral   ovaries are believed to be seen and appear to be within normal limits.    Differential includes large degenerating fibroid however malignant   etiologies are not excluded (ie uterine sarcoma or endometrial carcinoma   versus less likely ovarian malignancy). Recommend MRI of the pelvis with   IV contrast for further evaluation/better delineation of the ovaries,   myometrium and endometrium.    2.  The appendix is prominent, measuring up to 1.3 cm in diameter and   appears thick walled, with trace/equivocal periappendiceal inflammation;   finding is nonspecific and although could represent reactive change   related to large adjacent pelvic mass, the possibility of early,   uncomplicated appendicitis is not reliably excludable.    3.  There is a 3.1 x 1.7 x 1.7 cm hypodensity at the lower pole of the   left kidney with suggestion of a septation, not characterized on the   current exam but probably representing a septated cyst.  Can consider   initial characterization with ultrasound.  Alternatively, renal mass   protocol MRI or CT can be obtained for primary characterization.    4.  Cholelithiasis.    5.  There is a tiny, 1 -2 mm right breast calcification. Correlate with   dedicated breast imaging when clinically feasible.       (2024 14:35)      PAST MEDICAL & SURGICAL HISTORY:  HTN (hypertension)      History of  section          SOCIAL HISTORY: No active tobacco, alcohol or illicit drug use    FAMILY HISTORY:      Home Medications:      MEDICATIONS  (STANDING):  amLODIPine   Tablet 10 milliGRAM(s) Oral daily  pantoprazole  Injectable 40 milliGRAM(s) IV Push daily  piperacillin/tazobactam IVPB.. 3.375 Gram(s) IV Intermittent every 8 hours  potassium chloride  10 mEq/100 mL IVPB 10 milliEquivalent(s) IV Intermittent every 1 hour  potassium phosphate IVPB 15 milliMole(s) IV Intermittent once  senna 2 Tablet(s) Oral at bedtime  sodium chloride 0.9%. 1000 milliLiter(s) (100 mL/Hr) IV Continuous <Continuous>    MEDICATIONS  (PRN):  acetaminophen     Tablet .. 650 milliGRAM(s) Oral every 6 hours PRN Temp greater or equal to 38C (100.4F), Mild Pain (1 - 3)  acetaminophen   IVPB .. 1000 milliGRAM(s) IV Intermittent once PRN Moderate Pain (4 - 6)  melatonin 5 milliGRAM(s) Oral at bedtime PRN Insomnia      Allergies    No Known Allergies    Intolerances        REVIEW OF SYSTEMS: Negative except as per HPI.    VITAL SIGNS:   Vital Signs Last 24 Hrs  T(C): 36.7 (2024 05:17), Max: 36.7 (2024 13:03)  T(F): 98 (2024 05:17), Max: 98.1 (2024 21:24)  HR: 74 (2024 05:17) (66 - 96)  BP: 145/84 (2024 05:17) (134/77 - 145/84)  BP(mean): --  RR: 19 (2024 05:17) (17 - 19)  SpO2: 95% (2024 05:17) (95% - 97%)    Parameters below as of 2024 05:17  Patient On (Oxygen Delivery Method): nasal cannula  O2 Flow (L/min): 2      I&O's Summary    2024 07:01  -  2024 07:00  --------------------------------------------------------  IN: 250 mL / OUT: 300 mL / NET: -50 mL        PHYSICAL EXAM:  Constitutional: NAD, well-developed  HEENT NC/AT, moist mucous membranes  Pulmonary: Non-labored, breath sounds are clear bilaterally, no wheezing, rales or rhonchi  Cardiovascular: +S1, S2, RRR, no murmur  Gastrointestinal: Soft, nontender, nondistended, normoactive bowel sounds  Extremities: No peripheral edema   Neurological: Alert, strength and sensitivity are grossly intact  Skin: No obvious lesions/rashes  Psych: Mood & affect appropriate    LABS: All Labs Reviewed:                        14.5   6.06  )-----------( 321      ( 2024 05:07 )             41.7                         18.2   9.96  )-----------( 362      ( 2024 09:41 )             52.4     2024 05:07    141    |  113    |  8      ----------------------------<  104    3.1     |  27     |  0.62   2024 09:41    135    |  101    |  9      ----------------------------<  155    3.8     |  25     |  0.70     Ca    8.1        2024 05:07  Ca    9.0        2024 09:41  Phos  2.3       2024 05:07  Mg     2.1       2024 05:07    TPro  8.9    /  Alb  4.3    /  TBili  0.7    /  DBili  x      /  AST  25     /  ALT  43     /  AlkPhos  76     2024 09:41    PT/INR - ( 2024 05:07 )   PT: 11.6 sec;   INR: 0.99 ratio         PTT - ( 2024 05:07 )  PTT:29.1 sec      Blood Culture:         EKG:    RADIOLOGY:    CXR:   [facility]    Edwin Cesar, Conrad, Julissa, Rahul, Edgar, Yonny       [phone] (office)    Reason for Consult:    Interval HPI: Patient seen and examined at bedside. Pt states her pain is much improved and is currently asymptomatic. She states she had a history of abnormal EKG in 2017 with her PCP. She f/u with Cardiologist and a stress test was done with negative result. Pt has not done a TTE. Pt only PMHx is HTN and currently on Amlodipine 10mg. Tolerates well w/o any lower ext edema.     HPI:  67 yo female presented to ED due to one day lower abdominal pain with N/V.   patient states she was in a USOH when she noted feeling "gassy",  tried moving her bowels with no success, patient then became  nauseous with few episodes of vomiting throughout the night combined with lower abdominal pain 8/10 which localized to her right lower abdomin as the night progressed. patient reports the pain was non radiating non positional with no alleviating or aggravating factors. patient with known large fibroid uterus  states that she has had mild discomfort from time to time due to positioning and size of her uterus but has not experienced such severity and intensity in her symptoms. no other complaints this time .no fever , chills , sick contact reported.  last normal BM yesterday, no urinary changes reported. surgical hysterectomy is scheduled in march of this year w her GYN . patient reports improvement of symptoms in ED.   < from: CT Abdomen and Pelvis w/ IV Cont (24 @ 10:30) >  1.  Large, heterogenous, cystic and solid appearing mass in the pelvis   measuring 10.5 x 15.5 x 13 cm,which appears to be originating from the   fundus of the uterus and demonstrates a well defined border.  Faintly   visualized endometrium appears grossly within normal limits.   Bilateral   ovaries are believed to be seen and appear to be within normal limits.    Differential includes large degenerating fibroid however malignant   etiologies are not excluded (ie uterine sarcoma or endometrial carcinoma   versus less likely ovarian malignancy). Recommend MRI of the pelvis with   IV contrast for further evaluation/better delineation of the ovaries,   myometrium and endometrium.    2.  The appendix is prominent, measuring up to 1.3 cm in diameter and   appears thick walled, with trace/equivocal periappendiceal inflammation;   finding is nonspecific and although could represent reactive change   related to large adjacent pelvic mass, the possibility of early,   uncomplicated appendicitis is not reliably excludable.    3.  There is a 3.1 x 1.7 x 1.7 cm hypodensity at the lower pole of the   left kidney with suggestion of a septation, not characterized on the   current exam but probably representing a septated cyst.  Can consider   initial characterization with ultrasound.  Alternatively, renal mass   protocol MRI or CT can be obtained for primary characterization.    4.  Cholelithiasis.    5.  There is a tiny, 1 -2 mm right breast calcification. Correlate with   dedicated breast imaging when clinically feasible.       (2024 14:35)      PAST MEDICAL & SURGICAL HISTORY:  HTN (hypertension)      History of  section          SOCIAL HISTORY: No active tobacco, alcohol or illicit drug use    FAMILY HISTORY:      Home Medications:      MEDICATIONS  (STANDING):  amLODIPine   Tablet 10 milliGRAM(s) Oral daily  pantoprazole  Injectable 40 milliGRAM(s) IV Push daily  piperacillin/tazobactam IVPB.. 3.375 Gram(s) IV Intermittent every 8 hours  potassium chloride  10 mEq/100 mL IVPB 10 milliEquivalent(s) IV Intermittent every 1 hour  potassium phosphate IVPB 15 milliMole(s) IV Intermittent once  senna 2 Tablet(s) Oral at bedtime  sodium chloride 0.9%. 1000 milliLiter(s) (100 mL/Hr) IV Continuous <Continuous>    MEDICATIONS  (PRN):  acetaminophen     Tablet .. 650 milliGRAM(s) Oral every 6 hours PRN Temp greater or equal to 38C (100.4F), Mild Pain (1 - 3)  acetaminophen   IVPB .. 1000 milliGRAM(s) IV Intermittent once PRN Moderate Pain (4 - 6)  melatonin 5 milliGRAM(s) Oral at bedtime PRN Insomnia      Allergies    No Known Allergies    Intolerances        REVIEW OF SYSTEMS: Negative except as per HPI.    VITAL SIGNS:   Vital Signs Last 24 Hrs  T(C): 36.7 (2024 05:17), Max: 36.7 (2024 13:03)  T(F): 98 (2024 05:17), Max: 98.1 (2024 21:24)  HR: 74 (2024 05:17) (66 - 96)  BP: 145/84 (2024 05:17) (134/77 - 145/84)  BP(mean): --  RR: 19 (2024 05:17) (17 - 19)  SpO2: 95% (2024 05:17) (95% - 97%)    Parameters below as of 2024 05:17  Patient On (Oxygen Delivery Method): nasal cannula  O2 Flow (L/min): 2      I&O's Summary    2024 07:01  -  2024 07:00  --------------------------------------------------------  IN: 250 mL / OUT: 300 mL / NET: -50 mL        PHYSICAL EXAM:  Constitutional: NAD, well-developed  HEENT NC/AT, moist mucous membranes  Pulmonary: Non-labored, breath sounds are clear bilaterally, no wheezing, rales or rhonchi  Cardiovascular: +S1, S2, RRR, no murmur  Gastrointestinal: Soft, nontender, nondistended, normoactive bowel sounds  Extremities: No peripheral edema   Neurological: Alert, strength and sensitivity are grossly intact  Skin: No obvious lesions/rashes  Psych: Mood & affect appropriate    LABS: All Labs Reviewed:                        14.5   6.06  )-----------( 321      ( 2024 05:07 )             41.7                         18.2   9.96  )-----------( 362      ( 2024 09:41 )             52.4     2024 05:07    141    |  113    |  8      ----------------------------<  104    3.1     |  27     |  0.62   2024 09:41    135    |  101    |  9      ----------------------------<  155    3.8     |  25     |  0.70     Ca    8.1        2024 05:07  Ca    9.0        2024 09:41  Phos  2.3       2024 05:07  Mg     2.1       2024 05:07    TPro  8.9    /  Alb  4.3    /  TBili  0.7    /  DBili  x      /  AST  25     /  ALT  43     /  AlkPhos  76     2024 09:41    PT/INR - ( 2024 05:07 )   PT: 11.6 sec;   INR: 0.99 ratio         PTT - ( 2024 05:07 )  PTT:29.1 sec      Blood Culture:         EKG:    RADIOLOGY:    CXR:   [facility]    Edwin Cesar, Conrad, Julissa, Rahul, Edgar, Yonny       [phone] (office)    Reason for Consult:    Interval HPI: Patient seen and examined at bedside. Pt states her pain is much improved and is currently asymptomatic. She states she had a history of abnormal EKG in 2017 with her PCP. She f/u with Cardiologist and a stress test was done with negative result. Pt has not done a TTE. Pt only PMHx is HTN and currently on Amlodipine 10mg. Tolerates well w/o any lower ext edema. No family hx of heart disease    HPI:  67 yo female presented to ED due to one day lower abdominal pain with N/V.   patient states she was in a USOH when she noted feeling "gassy",  tried moving her bowels with no success, patient then became  nauseous with few episodes of vomiting throughout the night combined with lower abdominal pain 8/10 which localized to her right lower abdomin as the night progressed. patient reports the pain was non radiating non positional with no alleviating or aggravating factors. patient with known large fibroid uterus  states that she has had mild discomfort from time to time due to positioning and size of her uterus but has not experienced such severity and intensity in her symptoms. no other complaints this time .no fever , chills , sick contact reported.  last normal BM yesterday, no urinary changes reported. surgical hysterectomy is scheduled in march of this year w her GYN . patient reports improvement of symptoms in ED.   < from: CT Abdomen and Pelvis w/ IV Cont (24 @ 10:30) >  1.  Large, heterogenous, cystic and solid appearing mass in the pelvis   measuring 10.5 x 15.5 x 13 cm,which appears to be originating from the   fundus of the uterus and demonstrates a well defined border.  Faintly   visualized endometrium appears grossly within normal limits.   Bilateral   ovaries are believed to be seen and appear to be within normal limits.    Differential includes large degenerating fibroid however malignant   etiologies are not excluded (ie uterine sarcoma or endometrial carcinoma   versus less likely ovarian malignancy). Recommend MRI of the pelvis with   IV contrast for further evaluation/better delineation of the ovaries,   myometrium and endometrium.    2.  The appendix is prominent, measuring up to 1.3 cm in diameter and   appears thick walled, with trace/equivocal periappendiceal inflammation;   finding is nonspecific and although could represent reactive change   related to large adjacent pelvic mass, the possibility of early,   uncomplicated appendicitis is not reliably excludable.    3.  There is a 3.1 x 1.7 x 1.7 cm hypodensity at the lower pole of the   left kidney with suggestion of a septation, not characterized on the   current exam but probably representing a septated cyst.  Can consider   initial characterization with ultrasound.  Alternatively, renal mass   protocol MRI or CT can be obtained for primary characterization.    4.  Cholelithiasis.    5.  There is a tiny, 1 -2 mm right breast calcification. Correlate with   dedicated breast imaging when clinically feasible.       (2024 14:35)      PAST MEDICAL & SURGICAL HISTORY:  HTN (hypertension)      History of  section          SOCIAL HISTORY: No active tobacco, alcohol or illicit drug use    FAMILY HISTORY:      Home Medications:      MEDICATIONS  (STANDING):  amLODIPine   Tablet 10 milliGRAM(s) Oral daily  pantoprazole  Injectable 40 milliGRAM(s) IV Push daily  piperacillin/tazobactam IVPB.. 3.375 Gram(s) IV Intermittent every 8 hours  potassium chloride  10 mEq/100 mL IVPB 10 milliEquivalent(s) IV Intermittent every 1 hour  potassium phosphate IVPB 15 milliMole(s) IV Intermittent once  senna 2 Tablet(s) Oral at bedtime  sodium chloride 0.9%. 1000 milliLiter(s) (100 mL/Hr) IV Continuous <Continuous>    MEDICATIONS  (PRN):  acetaminophen     Tablet .. 650 milliGRAM(s) Oral every 6 hours PRN Temp greater or equal to 38C (100.4F), Mild Pain (1 - 3)  acetaminophen   IVPB .. 1000 milliGRAM(s) IV Intermittent once PRN Moderate Pain (4 - 6)  melatonin 5 milliGRAM(s) Oral at bedtime PRN Insomnia      Allergies    No Known Allergies    Intolerances        REVIEW OF SYSTEMS: Negative except as per HPI.    VITAL SIGNS:   Vital Signs Last 24 Hrs  T(C): 36.7 (2024 05:17), Max: 36.7 (2024 13:03)  T(F): 98 (2024 05:17), Max: 98.1 (2024 21:24)  HR: 74 (2024 05:17) (66 - 96)  BP: 145/84 (2024 05:17) (134/77 - 145/84)  BP(mean): --  RR: 19 (2024 05:17) (17 - 19)  SpO2: 95% (2024 05:17) (95% - 97%)    Parameters below as of 2024 05:17  Patient On (Oxygen Delivery Method): nasal cannula  O2 Flow (L/min): 2      I&O's Summary    2024 07:01  -  2024 07:00  --------------------------------------------------------  IN: 250 mL / OUT: 300 mL / NET: -50 mL        PHYSICAL EXAM:  Constitutional: NAD, well-developed  HEENT NC/AT, moist mucous membranes  Pulmonary: Non-labored, breath sounds are clear bilaterally, no wheezing, rales or rhonchi  Cardiovascular: +S1, S2, RRR, no murmur  Gastrointestinal: Soft, nontender, nondistended, normoactive bowel sounds  Extremities: No peripheral edema   Neurological: Alert, strength and sensitivity are grossly intact  Skin: No obvious lesions/rashes  Psych: Mood & affect appropriate    LABS: All Labs Reviewed:                        14.5   6.06  )-----------( 321      ( 2024 05:07 )             41.7                         18.2   9.96  )-----------( 362      ( 2024 09:41 )             52.4     2024 05:07    141    |  113    |  8      ----------------------------<  104    3.1     |  27     |  0.62   2024 09:41    135    |  101    |  9      ----------------------------<  155    3.8     |  25     |  0.70     Ca    8.1        2024 05:07  Ca    9.0        2024 09:41  Phos  2.3       2024 05:07  Mg     2.1       2024 05:07    TPro  8.9    /  Alb  4.3    /  TBili  0.7    /  DBili  x      /  AST  25     /  ALT  43     /  AlkPhos  76     2024 09:41    PT/INR - ( 2024 05:07 )   PT: 11.6 sec;   INR: 0.99 ratio         PTT - ( 2024 05:07 )  PTT:29.1 sec      Blood Culture:         EKG:    RADIOLOGY:    CXR:   [facility]    Edwin Cesar, Conrad, Julissa, Rahul, Edgar, Yonny       [phone] (office)    Reason for Consult:    Interval HPI: Patient seen and examined at bedside. Pt states her pain is much improved and is currently asymptomatic. She states she had a history of abnormal EKG in 2017 with her PCP. She f/u with Cardiologist and a stress test was done with negative result. Pt has not done a TTE. Pt only PMHx is HTN and currently on Amlodipine 10mg. Tolerates well w/o any lower ext edema. No family hx of heart disease    HPI:  69 yo female presented to ED due to one day lower abdominal pain with N/V.   patient states she was in a USOH when she noted feeling "gassy",  tried moving her bowels with no success, patient then became  nauseous with few episodes of vomiting throughout the night combined with lower abdominal pain 8/10 which localized to her right lower abdomin as the night progressed. patient reports the pain was non radiating non positional with no alleviating or aggravating factors. patient with known large fibroid uterus  states that she has had mild discomfort from time to time due to positioning and size of her uterus but has not experienced such severity and intensity in her symptoms. no other complaints this time .no fever , chills , sick contact reported.  last normal BM yesterday, no urinary changes reported. surgical hysterectomy is scheduled in march of this year w her GYN . patient reports improvement of symptoms in ED.   < from: CT Abdomen and Pelvis w/ IV Cont (24 @ 10:30) >  1.  Large, heterogenous, cystic and solid appearing mass in the pelvis   measuring 10.5 x 15.5 x 13 cm,which appears to be originating from the   fundus of the uterus and demonstrates a well defined border.  Faintly   visualized endometrium appears grossly within normal limits.   Bilateral   ovaries are believed to be seen and appear to be within normal limits.    Differential includes large degenerating fibroid however malignant   etiologies are not excluded (ie uterine sarcoma or endometrial carcinoma   versus less likely ovarian malignancy). Recommend MRI of the pelvis with   IV contrast for further evaluation/better delineation of the ovaries,   myometrium and endometrium.    2.  The appendix is prominent, measuring up to 1.3 cm in diameter and   appears thick walled, with trace/equivocal periappendiceal inflammation;   finding is nonspecific and although could represent reactive change   related to large adjacent pelvic mass, the possibility of early,   uncomplicated appendicitis is not reliably excludable.    3.  There is a 3.1 x 1.7 x 1.7 cm hypodensity at the lower pole of the   left kidney with suggestion of a septation, not characterized on the   current exam but probably representing a septated cyst.  Can consider   initial characterization with ultrasound.  Alternatively, renal mass   protocol MRI or CT can be obtained for primary characterization.    4.  Cholelithiasis.    5.  There is a tiny, 1 -2 mm right breast calcification. Correlate with   dedicated breast imaging when clinically feasible.       (2024 14:35)      PAST MEDICAL & SURGICAL HISTORY:  HTN (hypertension)      History of  section          SOCIAL HISTORY: No active tobacco, alcohol or illicit drug use    FAMILY HISTORY:      Home Medications:      MEDICATIONS  (STANDING):  amLODIPine   Tablet 10 milliGRAM(s) Oral daily  pantoprazole  Injectable 40 milliGRAM(s) IV Push daily  piperacillin/tazobactam IVPB.. 3.375 Gram(s) IV Intermittent every 8 hours  potassium chloride  10 mEq/100 mL IVPB 10 milliEquivalent(s) IV Intermittent every 1 hour  potassium phosphate IVPB 15 milliMole(s) IV Intermittent once  senna 2 Tablet(s) Oral at bedtime  sodium chloride 0.9%. 1000 milliLiter(s) (100 mL/Hr) IV Continuous <Continuous>    MEDICATIONS  (PRN):  acetaminophen     Tablet .. 650 milliGRAM(s) Oral every 6 hours PRN Temp greater or equal to 38C (100.4F), Mild Pain (1 - 3)  acetaminophen   IVPB .. 1000 milliGRAM(s) IV Intermittent once PRN Moderate Pain (4 - 6)  melatonin 5 milliGRAM(s) Oral at bedtime PRN Insomnia      Allergies    No Known Allergies    Intolerances        REVIEW OF SYSTEMS: Negative except as per HPI.    VITAL SIGNS:   Vital Signs Last 24 Hrs  T(C): 36.7 (2024 05:17), Max: 36.7 (2024 13:03)  T(F): 98 (2024 05:17), Max: 98.1 (2024 21:24)  HR: 74 (2024 05:17) (66 - 96)  BP: 145/84 (2024 05:17) (134/77 - 145/84)  BP(mean): --  RR: 19 (2024 05:17) (17 - 19)  SpO2: 95% (2024 05:17) (95% - 97%)    Parameters below as of 2024 05:17  Patient On (Oxygen Delivery Method): nasal cannula  O2 Flow (L/min): 2      I&O's Summary    2024 07:01  -  2024 07:00  --------------------------------------------------------  IN: 250 mL / OUT: 300 mL / NET: -50 mL        PHYSICAL EXAM:  Constitutional: NAD, well-developed  HEENT NC/AT, moist mucous membranes  Pulmonary: Non-labored, breath sounds are clear bilaterally, no wheezing, rales or rhonchi  Cardiovascular: +S1, S2, RRR, no murmur  Gastrointestinal: Soft, nontender, nondistended, normoactive bowel sounds  Extremities: No peripheral edema   Neurological: Alert, strength and sensitivity are grossly intact  Skin: No obvious lesions/rashes  Psych: Mood & affect appropriate    LABS: All Labs Reviewed:                        14.5   6.06  )-----------( 321      ( 2024 05:07 )             41.7                         18.2   9.96  )-----------( 362      ( 2024 09:41 )             52.4     2024 05:07    141    |  113    |  8      ----------------------------<  104    3.1     |  27     |  0.62   2024 09:41    135    |  101    |  9      ----------------------------<  155    3.8     |  25     |  0.70     Ca    8.1        2024 05:07  Ca    9.0        2024 09:41  Phos  2.3       2024 05:07  Mg     2.1       2024 05:07    TPro  8.9    /  Alb  4.3    /  TBili  0.7    /  DBili  x      /  AST  25     /  ALT  43     /  AlkPhos  76     2024 09:41    PT/INR - ( 2024 05:07 )   PT: 11.6 sec;   INR: 0.99 ratio         PTT - ( 2024 05:07 )  PTT:29.1 sec      Blood Culture:         EKG:    RADIOLOGY:    CXR:

## 2024-01-04 NOTE — PROGRESS NOTE ADULT - PROBLEM SELECTOR PLAN 1
- Continue antibiotics  - ADAT   - On discharge 7 days of Vantin 200mg po BID and Flagyl 500mg TID for 7 days. To follow up with general surgery in 2 weeks.  - Patient to follow up with Gyn for elective hysterectomy.   - Plan discussed with Dr. Cuevas
now improved  CT abd with contrast show sign of acute appendicitis and large pelvic mass   started zosyn   Sx eval noted  GYN eval  plan of care as below

## 2024-01-04 NOTE — PROGRESS NOTE ADULT - ASSESSMENT
78 yo female with large uterine fibroid presented w abdominal pain, early appendicitis vs symptomatic uterine mass effect. Admitted to surgery for IV hydratiuon and antibiotics. Patient states she is feeling better. Clinically improved as well.    76 yo female with large uterine fibroid presented w abdominal pain, early appendicitis vs symptomatic uterine mass effect. Admitted to surgery for IV hydratiuon and antibiotics. Patient states she is feeling better. Clinically improved as well.    76 yo female with large uterine fibroid presented w abdominal pain, early appendicitis vs symptomatic uterine mass effect. Admitted to surgery for IV hydratiuon and antibiotics. Patient states she is feeling better. Clinically improved as well.     As in above full Resident notation.  Ms. Jo personally seen/ examined during afternoon rounds.  Reports resolution of pain and no further GI complaints.  Vitals non-suggestive throughout night.  Abdomen soft without any appreciable RLQ tenderness.  Labs reassuring with normal WBCs without shift.  Clinically, improving overall without deviation of vitals, benign exam and non suggestive labs.  Surgically, stable for present.  Will advance diet and if tolerated, discharge to home with oral ABX.  I have discussed with patient,  and son the possibility of low grade/ uncomplicated appendicitis versus reactive changes from uterine disease +/- symptomatic mass effect.  They are aware to contact me for any clinical deterioration.  Otherwise, office follow-up in 2 weeks.  I have strongly recommended, they are aware, and agree to at this time elective hysterectomy at the earliest date with appendectomy at that procedure.  They have a gynecologist already and wish to stay within the [facility] system.  While this is acceptable and their choice, I have offered to recommend a Gynecologist/ Gynecologic Oncologist within [facility] should there be difficulty in obtaining timely follow-up or a definite surgical date.  Again, they are pleased and agree.   78 yo female with large uterine fibroid presented w abdominal pain, early appendicitis vs symptomatic uterine mass effect. Admitted to surgery for IV hydratiuon and antibiotics. Patient states she is feeling better. Clinically improved as well.     As in above full Resident notation.  Ms. Jo personally seen/ examined during afternoon rounds.  Reports resolution of pain and no further GI complaints.  Vitals non-suggestive throughout night.  Abdomen soft without any appreciable RLQ tenderness.  Labs reassuring with normal WBCs without shift.  Clinically, improving overall without deviation of vitals, benign exam and non suggestive labs.  Surgically, stable for present.  Will advance diet and if tolerated, discharge to home with oral ABX.  I have discussed with patient,  and son the possibility of low grade/ uncomplicated appendicitis versus reactive changes from uterine disease +/- symptomatic mass effect.  They are aware to contact me for any clinical deterioration.  Otherwise, office follow-up in 2 weeks.  I have strongly recommended, they are aware, and agree to at this time elective hysterectomy at the earliest date with appendectomy at that procedure.  They have a gynecologist already and wish to stay within the [facility] system.  While this is acceptable and their choice, I have offered to recommend a Gynecologist/ Gynecologic Oncologist within [facility] should there be difficulty in obtaining timely follow-up or a definite surgical date.  Again, they are pleased and agree.

## 2024-01-04 NOTE — DISCHARGE NOTE PROVIDER - CARE PROVIDER_API CALL
Marlon Cuevas  Surgery  [address] [place] [address]  Phone: [phone]  Fax: [phone]  Follow Up Time: 2 weeks   Marlon Cuevas  Surgery  [address] [place] [address]  Phone: [phone]  Fax: [phone]  Follow Up Time: 2 weeks

## 2024-01-04 NOTE — PROGRESS NOTE ADULT - PROBLEM SELECTOR PLAN 7
3.  There is a 3.1 x 1.7 x 1.7 cm hypodensity at the lower pole of the   left kidney with suggestion of a septation, not characterized on the   current exam but probably representing a septated cyst.  Can consider   initial characterization with ultrasound.  Alternatively, renal mass   protocol MRI or CT can be obtained for primary characterization.      5.  There is a tiny, 1 -2 mm right breast calcification. Correlate with   dedicated breast imaging when clinically feasible.    will need renal , breast imaging out patient

## 2024-01-04 NOTE — PROGRESS NOTE ADULT - SUBJECTIVE AND OBJECTIVE BOX
SURGERY NOTE  S: Patient seen and examined at bedside.  Resting comfortably in no acute distress. Patient states she feels fine and has had no nausea, vomiting or pain.     MEDICATIONS:  acetaminophen     Tablet .. 650 milliGRAM(s) Oral every 6 hours PRN  acetaminophen   IVPB .. 1000 milliGRAM(s) IV Intermittent once PRN  amLODIPine   Tablet 10 milliGRAM(s) Oral daily  melatonin 5 milliGRAM(s) Oral at bedtime PRN  pantoprazole  Injectable 40 milliGRAM(s) IV Push daily  piperacillin/tazobactam IVPB.. 3.375 Gram(s) IV Intermittent every 8 hours  potassium phosphate IVPB 15 milliMole(s) IV Intermittent once  senna 2 Tablet(s) Oral at bedtime  sodium chloride 0.9%. 1000 milliLiter(s) IV Continuous <Continuous>      O:  Vital Signs Last 24 Hrs  T(C): 36.4 (04 Jan 2024 12:26), Max: 36.7 (03 Jan 2024 17:09)  T(F): 97.6 (04 Jan 2024 12:26), Max: 98.1 (03 Jan 2024 21:24)  HR: 72 (04 Jan 2024 12:26) (68 - 96)  BP: 135/83 (04 Jan 2024 12:26) (134/77 - 145/84)  BP(mean): --  RR: 18 (04 Jan 2024 12:26) (17 - 19)  SpO2: 96% (04 Jan 2024 12:26) (95% - 97%)    Parameters below as of 04 Jan 2024 12:26  Patient On (Oxygen Delivery Method): room air        I&O SUMMARY:    01-03-24 @ 07:01  -  01-04-24 @ 07:00  --------------------------------------------------------  IN: 250 mL / OUT: 300 mL / NET: -50 mL        PHYSICAL EXAM:    General: Resting comortably, NAD.  Lungs: CTA bilat without W/R/R  Card: S1S2  Abd: Soft, NT, ND.  +BS x 4.  No rebound/guarding.  Nontender  Ext: Calves soft, NT, without edema bilat    LABS:                        15.1   5.31  )-----------( 332      ( 04 Jan 2024 12:40 )             43.3     01-04    141  |  113<H>  |  8   ----------------------------<  104<H>  3.1<L>   |  27  |  0.62    Ca    8.1<L>      04 Jan 2024 05:07  Phos  2.3     01-04  Mg     2.1     01-04    TPro  8.9<H>  /  Alb  4.3  /  TBili  0.7  /  DBili  x   /  AST  25  /  ALT  43  /  AlkPhos  76  01-03    PT/INR - ( 04 Jan 2024 05:07 )   PT: 11.6 sec;   INR: 0.99 ratio         PTT - ( 04 Jan 2024 05:07 )  PTT:29.1 sec          RADIOLOGY:  No new imaging studies      
Patient is a 68y old  Female who presents with a chief complaint of appendicitis (04 Jan 2024 02:06)      Subjective:  INTERVAL HPI/OVERNIGHT EVENTS: Patient seen and examined at bedside.  Patient states she is feeling much better , no N/V/F/chills. she has right lower abd "tenderness " for years   last BM tuesday   MEDICATIONS  (STANDING):  amLODIPine   Tablet 10 milliGRAM(s) Oral daily  pantoprazole  Injectable 40 milliGRAM(s) IV Push daily  piperacillin/tazobactam IVPB.. 3.375 Gram(s) IV Intermittent every 8 hours  potassium chloride  10 mEq/100 mL IVPB 10 milliEquivalent(s) IV Intermittent every 1 hour  potassium phosphate IVPB 15 milliMole(s) IV Intermittent once  senna 2 Tablet(s) Oral at bedtime  sodium chloride 0.9%. 1000 milliLiter(s) (100 mL/Hr) IV Continuous <Continuous>    MEDICATIONS  (PRN):  acetaminophen     Tablet .. 650 milliGRAM(s) Oral every 6 hours PRN Temp greater or equal to 38C (100.4F), Mild Pain (1 - 3)  acetaminophen   IVPB .. 1000 milliGRAM(s) IV Intermittent once PRN Moderate Pain (4 - 6)  melatonin 5 milliGRAM(s) Oral at bedtime PRN Insomnia  ondansetron Injectable 4 milliGRAM(s) IV Push every 6 hours PRN Nausea      Allergies    No Known Allergies    Intolerances        REVIEW OF SYSTEMS:  CONSTITUTIONAL: No fever or chills  HEENT:  No headache, no sore throat  RESPIRATORY: No cough or shortness of breath  CARDIOVASCULAR: No chest pain or palpitations  GASTROINTESTINAL:+ abd pain, nausea, vomiting, or diarrhea      Objective:  Vital Signs Last 24 Hrs  T(C): 36.7 (04 Jan 2024 05:17), Max: 36.7 (03 Jan 2024 13:03)  T(F): 98 (04 Jan 2024 05:17), Max: 98.1 (03 Jan 2024 21:24)  HR: 74 (04 Jan 2024 05:17) (66 - 96)  BP: 145/84 (04 Jan 2024 05:17) (134/77 - 145/84)  BP(mean): --  RR: 19 (04 Jan 2024 05:17) (17 - 19)  SpO2: 95% (04 Jan 2024 05:17) (95% - 97%)    Parameters below as of 04 Jan 2024 05:17  Patient On (Oxygen Delivery Method): nasal cannula  O2 Flow (L/min): 2      GENERAL: NAD, lying in bed comfortably  HEAD:  Normocephalic  EYES:  conjunctiva and sclera clear  ENT: Moist mucous membranes  NECK: Supple  CHEST/LUNG: Clear to auscultation bilaterally; No rales or rhonchi; no wheezing. Unlabored respirations  HEART: Regular rate and rhythm; S1S2+  ABDOMEN: Bowel sounds present; Soft, Nontender,+ distended. mild tenderness R pelvic area , no rebound    EXTREMITIES:  + distal Peripheral Pulses;  No cyanosis, or edema  NERVOUS SYSTEM:  Alert & Oriented X3;  No gross focal deficits   MSK: moves all extremities  SKIN: No rashes     LABS:                        14.5   6.06  )-----------( 321      ( 04 Jan 2024 05:07 )             41.7     04 Jan 2024 05:07    141    |  113    |  8      ----------------------------<  104    3.1     |  27     |  0.62     Ca    8.1        04 Jan 2024 05:07  Phos  2.3       04 Jan 2024 05:07  Mg     2.1       04 Jan 2024 05:07    TPro  8.9    /  Alb  4.3    /  TBili  0.7    /  DBili  x      /  AST  25     /  ALT  43     /  AlkPhos  76     03 Jan 2024 09:41    PT/INR - ( 04 Jan 2024 05:07 )   PT: 11.6 sec;   INR: 0.99 ratio         PTT - ( 04 Jan 2024 05:07 )  PTT:29.1 sec  Urinalysis Basic - ( 04 Jan 2024 05:07 )    Color: x / Appearance: x / SG: x / pH: x  Gluc: 104 mg/dL / Ketone: x  / Bili: x / Urobili: x   Blood: x / Protein: x / Nitrite: x   Leuk Esterase: x / RBC: x / WBC x   Sq Epi: x / Non Sq Epi: x / Bacteria: x      CAPILLARY BLOOD GLUCOSE              RADIOLOGY & ADDITIONAL TESTS:    Personally reviewed.     Consultant(s) Notes Reviewed:  [x] YES  [ ] NO    Plan of care discussed with patient ; all questions answered

## 2024-01-04 NOTE — PROGRESS NOTE ADULT - TIME BILLING
Reviewed with patient in detail,  and son in law at bedside, Hospitalist, Surgical PA's and Residents.
direct patient care including but not limited to reviewing chart, medications ,laboratory data, imaging reports, discussion of plan of care with consultants on the case, coordination of care with multidisciplinary team involved in the case and discussion of plan with patient.  Patient and family agreeable to plan of care and verbalized understanding the anticipated hospital course and treatment plan.

## 2024-01-04 NOTE — DISCHARGE NOTE PROVIDER - NSDCCPCAREPLAN_GEN_ALL_CORE_FT
PRINCIPAL DISCHARGE DIAGNOSIS  Diagnosis: Acute appendicitis  Assessment and Plan of Treatment:       SECONDARY DISCHARGE DIAGNOSES  Diagnosis: Uterine fibroid  Assessment and Plan of Treatment:

## 2024-01-04 NOTE — PATIENT CHOICE NOTE. - NSPTCHOICESTATE_GEN_ALL_CORE
I have met with the patient and/or caregiver to discuss discharge goals and treatment plan. Patient and/or caregiver also provided with instructions on accessing the CMS Compare websites for additional information related to Post Acute Provider quality and resource use measures to assist them in evaluation of the providers and in selecting their post-acute provider of choice. Patient and caregiver were informed of the facilities that are owned and/or operated by [facility]. I have discussed with the patient the availability of in-network facilities and providers. Patient and caregiver provided with a list of post-acute providers whose services are appropriate to the discharge plans and patient needs.     For patient requiring durable medical equipment, patient and/or caregiver were informed that they have the right to request who provides the required equipment. I have met with the patient and/or caregiver to discuss discharge goals and treatment plan. Patient and/or caregiver also provided with instructions on accessing the CMS Compare websites for additional information related to Post Acute Provider quality and resource use measures to assist them in evaluation of the providers and in selecting their post-acute provider of choice. Patient and caregiver were informed of the facilities that are owned and/or operated by [facility]. I have discussed with the patient the availability of in-network facilities and providers. Patient and caregiver provided with a list of post-acute providers whose services are appropriate to the discharge plans and patient needs.     For patient requiring durable medical equipment, patient and/or caregiver were informed that they have the right to request who provides the required equipment.

## 2024-01-04 NOTE — CONSULT NOTE ADULT - ATTENDING COMMENTS
67 yo female presented to ED due to one day lower abdominal pain with N/V and admitted to surgery for management of appendicitis vs uterine mass affect.    - Patient has no cardiac c/o at this time.   -EKG: NSR w. frequent PVCs. Possible LA enlargement. LBBB  -Pt has no TTE on file, F/U TTE  - Continue home amlodipine 10mg qd.  - Pt has no modifiable active cardiac risk factors and in the setting of low risk surgery., patient is optimized as best as possible from cardiovascular standpoint to proceed with planned procedure with routine hemodynamic monitoring.  -RCRI score of 0 69 yo female presented to ED due to one day lower abdominal pain with N/V and admitted to surgery for management of appendicitis vs uterine mass affect.    - Patient has no cardiac c/o at this time.   -EKG: NSR w. frequent PVCs. Possible LA enlargement. LBBB  -Pt has no TTE on file, F/U TTE  - Continue home amlodipine 10mg qd.  - Pt has no modifiable active cardiac risk factors and in the setting of low risk surgery., patient is optimized as best as possible from cardiovascular standpoint to proceed with planned procedure with routine hemodynamic monitoring.  -RCRI score of 0

## 2024-01-04 NOTE — CARE COORDINATION ASSESSMENT. - NSCAREPROVIDERS_GEN_ALL_CORE_FT
CARE PROVIDERS:  Accepting Physician: Marlon Cuevas  Administration: Galen King  Administration: Jj Joseph  Admitting: Marlon Cuevas  Attending: Marlon Cuevas  Cardiology Technician: Kate Lao  Case Management: Brianna Briceño  Case Management: Dulce Maria Davis  Consultant: Nina Lozada  Consultant: Zane Castro  Consultant: Kaylin Wallis  Consultant: José Antonio Pineda  Consultant: Marlon Cuevas  Consultant: Rajesh Cabello  Covering Team: Eveline Woodward  ED Attending: Zita Sevilla  ED Nurse: Alhaji oHwell  Nurse: Rosa Isaac  Nurse: Leatha Zarco  Ordered: ADM, User  Override: Janel Fitzgerald  PCA/Nursing Assistant: Martha Berrios  PCA/Nursing Assistant: Maryanne Bui  Primary Team: Josse Emerson  Primary Team: Ej Martin  Primary Team: Clifton Lomeli  Primary Team: Maria Esther Rose  Primary Team: Giselle Jaramillo  Primary Team: Mart Olivas  Quality Review: Jyoti Valdez  Registered Dietitian: Vale Dorsey  Team: [facility], Team   CARE PROVIDERS:  Accepting Physician: Marlon Cuevas  Administration: Galen King  Administration: Jj Joseph  Admitting: Marlon Cuevas  Attending: Marlon Cuevas  Cardiology Technician: Kate Lao  Case Management: Brianna Briceño  Case Management: Dulce Maria Davis  Consultant: Nina Lozada  Consultant: Zane Castro  Consultant: Kaylin Wallis  Consultant: José Antonio Pineda  Consultant: Marlon Cuevas  Consultant: Rajesh Cabello  Covering Team: Eveline Woodward  ED Attending: Zita Sevilla  ED Nurse: Alhaji Howell  Nurse: Rosa Isaac  Nurse: Leatha Zarco  Ordered: ADM, User  Override: Janel Fitzgerald  PCA/Nursing Assistant: Martha Berrios  PCA/Nursing Assistant: Maryanne Bui  Primary Team: Josse Emerson  Primary Team: Ej Martin  Primary Team: Clifton Lomeli  Primary Team: Maria Esther Rose  Primary Team: Giselle Jaramillo  Primary Team: Mart Olivas  Quality Review: Jyoti Valdez  Registered Dietitian: Vale Dorsey  Team: [facility], Team   CARE PROVIDERS:  Accepting Physician: Marlon Cuevas  Administration: Galen King  Administration: Jj Joseph  Admitting: Marlon Cuevas  Attending: Marlon Cuevas  Cardiology Technician: Kate Lao  Case Management: Brianna Briceño  Case Management: Dulce Maria Davis  Consultant: Nina Lozada  Consultant: Zane Castro  Consultant: Kaylin Wallis  Consultant: Celine Gorman  Consultant: José Antonio Pineda  Consultant: Devante Dockery  Consultant: Marlon Cuevas  Consultant: Rajesh Cabello  Covering Team: Eveline Woodward  ED Attending: Zita Sevilla ED Nurse: Alhaji Howell  Nurse: Leatha Zarco  Ordered: ADM, User  Override: Janel Fitzgerald  PCA/Nursing Assistant: Maryanne Bui  PCA/Nursing Assistant: Martha Berrios  Primary Team: Giselle Jaramillo  Primary Team: Mart Olivas  Primary Team: Josse Emerson  Primary Team: Ej Martin  Primary Team: Clifton Lomeli  Primary Team: Maria Esther Rose  Quality Review: Jyoti Valdez  Registered Dietitian: Vale Dorsey  Team: [facility], Team

## 2024-01-04 NOTE — PROGRESS NOTE ADULT - PROBLEM SELECTOR PLAN 6
EKG : SR with PVC LBBB. long QTC   Prior EKG obtained from PCP Dr Michael Lara office at [phone]  EKG in 2022 SR, no LBBB.   patient states she had "some issue " with EKG and was seen by cardiologist in 2017, stress test done at that time was  normal   Cardio eval in progress   replace electrolytes EKG : SR with PVC LBBB. long QTC   Prior EKG obtained from PCP Dr Michael Lara office at [phone]  EKG in 2022 SR, no LBBB.   patient states she had "some issue " with EKG and was seen by cardiologist in 2017, stress test done at that time was  normal   Cardio eval in progress   replace electrolytes EKG : SR with PVC LBBB. long QTC   Prior EKG obtained from PCP Dr Michael Lara office at [phone]  EKG in 2022 SR, no LBBB.   patient states she had "some issue " with EKG and was seen by cardiologist in 2017, stress test done at that time was  normal   Cardio eval in progress   replace electrolytes  avoid QT prolong medications : d/c zofran , tigan if needed for antinausea agent EKG : SR with PVC LBBB. long QTC   Prior EKG obtained from PCP Dr Michael Lara office at [phone]  EKG in 2022 SR, no LBBB.   patient states she had "some issue " with EKG and was seen by cardiologist in 2017, stress test done at that time was  normal   Cardio eval in progress   replace electrolytes  avoid QT prolong medications : d/c zofran , tigan if needed for antinausea agent

## 2024-01-04 NOTE — CONSULT NOTE ADULT - ASSESSMENT
appendicitis:  -C/w zosyn and IVF  -NPO for now  -surgery following    LBBB:  -No prior EKG/ECHO in file  -seems like pt did F/u with cardiologist in 2017-18 at [place] for this issue and no further intervention was recommended  -will need cardiology clearance pre op  -get mg level in AM  -monitor on tele   -get echo  -cardiology consult placed      appendicitis:  -C/w zosyn and IVF  -NPO for now  -surgery following    LBBB:  -No prior EKG/ECHO in file  -seems like pt did F/u with cardiologist in 2017-18 at [place] for this issue and no further intervention was recommended  -will need cardiology clearance pre op  -get mg level in AM  -monitor on tele   -get echo  -cardiology consult placed

## 2024-01-04 NOTE — CARE COORDINATION ASSESSMENT. - OTHER PERTINENT DISCHARGE PLANNING INFORMATION:
met with patient at bedside to introduce self. Role of case management explained. Patient verbalized understanding. Patient is from home with spouse. Admitted for Appendicitis, on Zosyn.  Choice and transition  resources explained and given with CM contact information. Patient understands that  will remain available.

## 2024-01-05 PROBLEM — I10 ESSENTIAL (PRIMARY) HYPERTENSION: Chronic | Status: ACTIVE | Noted: 2024-01-03

## 2024-01-16 ENCOUNTER — NON-APPOINTMENT (OUTPATIENT)
Age: 69
End: 2024-01-16

## 2024-01-17 ENCOUNTER — NON-APPOINTMENT (OUTPATIENT)
Age: 69
End: 2024-01-17

## 2024-01-17 ENCOUNTER — TRANSCRIPTION ENCOUNTER (OUTPATIENT)
Age: 69
End: 2024-01-17

## 2024-01-17 ENCOUNTER — APPOINTMENT (OUTPATIENT)
Dept: SURGERY | Facility: CLINIC | Age: 69
End: 2024-01-17
Payer: MEDICARE

## 2024-01-17 VITALS
OXYGEN SATURATION: 98 % | HEIGHT: 62 IN | HEART RATE: 96 BPM | TEMPERATURE: 97.2 F | WEIGHT: 158.73 LBS | BODY MASS INDEX: 29.21 KG/M2

## 2024-01-17 DIAGNOSIS — N85.8 OTHER SPECIFIED NONINFLAMMATORY DISORDERS OF UTERUS: ICD-10-CM

## 2024-01-17 DIAGNOSIS — I10 ESSENTIAL (PRIMARY) HYPERTENSION: ICD-10-CM

## 2024-01-17 PROCEDURE — 99215 OFFICE O/P EST HI 40 MIN: CPT

## 2024-01-17 RX ORDER — OLMESARTAN MEDOXOMIL 20 MG/1
20 TABLET, FILM COATED ORAL DAILY
Refills: 0 | Status: ACTIVE | COMMUNITY

## 2024-01-17 RX ORDER — AMLODIPINE BESYLATE 5 MG/1
5 TABLET ORAL DAILY
Refills: 0 | Status: ACTIVE | COMMUNITY

## 2024-01-17 NOTE — HISTORY OF PRESENT ILLNESS
[de-identified] : Very pleasant patient presenting to office for routine post discharge follow-up. Ms. Jo is now s/p recent hospitalization of possible appendicitis. Appendectomy was not immediately pursued secondary to a large uterine mass. Today, she is in excellent spirits, reports feeling well, denies abdominal pain and has completed her post discharge course of oral ABX without issue...

## 2024-01-17 NOTE — PLAN
[FreeTextEntry1] : Recent episode of lower abdominal/ RLQ abdominal pain.  CT indeterminate but suspicious for possible appendicitis.  Prompt and now complete response to medical management.  No pain today with benign exam after completion of course of oral ABX.  Mr. Jo reports that her former GYN is no longer booking cases.  As such, appointments pending in [facility] with referral to GYN ONC personally made today.  She understands my recommendation in the strongest terms for timely hysterectomy and simultaneous appendectomy.  She is pleased and agrees.  I remain available as needed.  Ms. Jo has been encouraged to call with interval questions/ concerns or difficulty in obtaining follow-up.  Please note that more than 50% of face to face time was spent in counseling and coordination of care.

## 2024-01-17 NOTE — PHYSICAL EXAM
[Respiratory Effort] : normal respiratory effort [Normal Rate and Rhythm] : normal rate and rhythm [No HSM] : no hepatosplenomegaly [No Rash or Lesion] : No rash or lesion [Alert] : alert [Oriented to Person] : oriented to person [Oriented to Place] : oriented to place [Oriented to Time] : oriented to time [Calm] : calm [Tender] : was nontender [Enlarged] : not enlarged [de-identified] : Appears well, no acute distress, ambulates easily into office and assumes examination table without need of assistance.  [de-identified] : Normocephalic and atraumatic.  [de-identified] : Supple with full range of motion.  [FreeTextEntry1] : No cervical, supraclavicular, axillary or inguinal adenopathy.  [de-identified] : Deferred.  [de-identified] : Full/ overweight, but soft/ non tense. Well healed scars c/w given history. RLQ free of visible fullness or palpable mass. Entire abdomen remains non tender. However, palpable uterus/ mass to level of ~ umbilicus. [de-identified] : Deferred.  [de-identified] : Deferred.  [de-identified] : Grossly symmetric and within normal limits without motor or sensory deficits.

## 2024-01-17 NOTE — DATA REVIEWED
[FreeTextEntry1] : ACC: [id number]     EXAM:  CT ABDOMEN AND PELVIS IC   ORDERED BY: LEANDRO RAGHAVENDRA TARYN PROCEDURE DATE:  01/03/2024 INTERPRETATION:  CT ABDOMEN AND PELVIS WITH CONTRAST Clinical Indication: RLQ abd pain, rule out appendicitis Technique: Axial CT images of the abdomen and pelvis were obtained from the lung bases to the pubic symphysis after the administration of IV contrast.  Coronal and sagittal reformatted images were created and reviewed. Comparison:  No prior studies are available for comparison. Radiograph of the chest from today, 1/3/2024 is reviewed. Findings: LOWER CHEST: The included lung bases are clear aside for subsegmental atelectatic changes at the posterior bilateral lower lobes. Coronary artery calcifications are noted. LIVER: Within normal limits. BILE DUCTS: Normal caliber. GALLBLADDER: A few gallstones are seen within the gallbladder. SPLEEN: Within normal limits. PANCREAS: Within normal limits. ADRENALS: Within normal limits. KIDNEYS/URETERS: The kidneys enhance symmetrically. No hydroureteronephrosis. There is a 3.1 x 1.7 x 1.7 cm hypodensity at the lower pole of the left kidney with suggestion of a septation, not characterized on the current exam but probably representing a septated cyst. Additional subcentimeter renal hypodensities are too small to characterize. BLADDER: The urinary bladder has a grossly unremarkable CT appearance. REPRODUCTIVE ORGANS: There is a large, heterogenous, cystic and solid appearing mass in the pelvis measuring 10.5 x 15.5 x 13 cm, which appears to be originating from the fundus of the uterus and demonstrates a well defined border.  Faintly visualized endometrium appears grossly within normal limits.   Bilateral ovaries are believed to be seen and appear to be within normal limits (right ovary on image 86, series 2 and left ovary on coronal image 40, series 602). BOWEL: There is no evidence for bowel obstruction. The appendix is prominent, measuring up to 1.3 cm in diameter and appears thick walled, with trace/equivocal periappendiceal inflammation; finding is nonspecific and although could represent reactive change related to large adjacent pelvic mass, the possibility of early, uncomplicated appendicitis is not reliably excludable. Colonic diverticulosis is seen without evidence for diverticulitis. Duodenal diverticulum is seen extending from the third portion of the duodenum. PERITONEUM: No ascites. No free air. VESSELS:  Patent celiac axis, SMA and VIK. Hepatic veins, portal vein, SMV, splenic vein and renal veins are patent.  No abdominal aortic aneurysm. Prominent right gonadal vein LYMPH NODES: No lymphadenopathy.    Mild haziness at the root of the mesentery. ABDOMINAL WALL: Tiny, 1 to 2 mm, right breast calcification. BONES: No suspicious osseous lesion. Mild scoliotic curvature. OTHER:  None IMPRESSION: 1.  Large, heterogenous, cystic and solid appearing mass in the pelvis measuring 10.5 x 15.5 x 13 cm, which appears to be originating from the fundus of the uterus and demonstrates a well defined border.  Faintly visualized endometrium appears grossly within normal limits.   Bilateral ovaries are believed to be seen and appear to be within normal limits.  Differential includes large degenerating fibroid however malignant etiologies are not excluded (ie uterine sarcoma or endometrial carcinoma versus less likely ovarian malignancy). Recommend MRI of the pelvis with IV contrast for further evaluation/better delineation of the ovaries, myometrium and endometrium. 2.  The appendix is prominent, measuring up to 1.3 cm in diameter and appears thick walled, with trace/equivocal periappendiceal inflammation; finding is nonspecific and although could represent reactive change related to large adjacent pelvic mass, the possibility of early, uncomplicated appendicitis is not reliably excludable. 3.  There is a 3.1 x 1.7 x 1.7 cm hypodensity at the lower pole of the left kidney with suggestion of a septation, not characterized on the current exam but probably representing a septated cyst.  Can consider initial characterization with ultrasound.  Alternatively, renal mass protocol MRI or CT can be obtained for primary characterization. 4.  Cholelithiasis. 5.  There is a tiny, 1 -2 mm right breast calcification. Correlate with dedicated breast imaging when clinically feasible. 6.  Other findings, as above. --- End of Report --- ANALIA NY MD; Attending Radiologist This document has been electronically signed. Judd  3 2024 12:25PM

## 2024-01-19 ENCOUNTER — APPOINTMENT (OUTPATIENT)
Dept: OBGYN | Facility: CLINIC | Age: 69
End: 2024-01-19
Payer: MEDICARE

## 2024-01-19 VITALS
DIASTOLIC BLOOD PRESSURE: 80 MMHG | WEIGHT: 156.25 LBS | SYSTOLIC BLOOD PRESSURE: 130 MMHG | BODY MASS INDEX: 28.75 KG/M2 | HEIGHT: 62 IN

## 2024-01-19 PROCEDURE — 99204 OFFICE O/P NEW MOD 45 MIN: CPT

## 2024-01-25 ENCOUNTER — OUTPATIENT (OUTPATIENT)
Dept: OUTPATIENT SERVICES | Facility: HOSPITAL | Age: 69
LOS: 1 days | End: 2024-01-25
Payer: MEDICARE

## 2024-01-25 ENCOUNTER — APPOINTMENT (OUTPATIENT)
Dept: MRI IMAGING | Facility: CLINIC | Age: 69
End: 2024-01-25
Payer: MEDICARE

## 2024-01-25 DIAGNOSIS — Z98.891 HISTORY OF UTERINE SCAR FROM PREVIOUS SURGERY: Chronic | ICD-10-CM

## 2024-01-25 DIAGNOSIS — D25.9 LEIOMYOMA OF UTERUS, UNSPECIFIED: ICD-10-CM

## 2024-01-25 PROCEDURE — 72197 MRI PELVIS W/O & W/DYE: CPT

## 2024-01-25 PROCEDURE — A9585: CPT

## 2024-01-25 PROCEDURE — 72197 MRI PELVIS W/O & W/DYE: CPT | Mod: 26,MH

## 2024-01-30 PROBLEM — D25.9 LEIOMYOMA OF UTERUS: Status: ACTIVE | Noted: 2024-01-30

## 2024-01-31 ENCOUNTER — APPOINTMENT (OUTPATIENT)
Dept: GYNECOLOGIC ONCOLOGY | Facility: CLINIC | Age: 69
End: 2024-01-31
Payer: MEDICARE

## 2024-01-31 VITALS
SYSTOLIC BLOOD PRESSURE: 140 MMHG | BODY MASS INDEX: 29.49 KG/M2 | RESPIRATION RATE: 16 BRPM | WEIGHT: 156.19 LBS | HEIGHT: 61 IN | HEART RATE: 107 BPM | DIASTOLIC BLOOD PRESSURE: 80 MMHG | OXYGEN SATURATION: 98 %

## 2024-01-31 DIAGNOSIS — D25.9 LEIOMYOMA OF UTERUS, UNSPECIFIED: ICD-10-CM

## 2024-01-31 DIAGNOSIS — Z80.0 FAMILY HISTORY OF MALIGNANT NEOPLASM OF DIGESTIVE ORGANS: ICD-10-CM

## 2024-01-31 PROCEDURE — 99204 OFFICE O/P NEW MOD 45 MIN: CPT

## 2024-01-31 PROCEDURE — 99459 PELVIC EXAMINATION: CPT

## 2024-02-05 ENCOUNTER — APPOINTMENT (OUTPATIENT)
Dept: OBGYN | Facility: CLINIC | Age: 69
End: 2024-02-05

## 2024-02-07 NOTE — ASSESSMENT
[FreeTextEntry1] : 70yo female with enlarged fibroid uterus.  After reviewing the patient's images of her CT scan and MRI pelvis, I do agree that there appears to by cystic degeneration of this fibroid, which can be concerning.  I discussed with the patient that ultimately, we cannot exclude a diagnosis of sarcoma without surgical intervention. This fibroid may be benign, but I recommend surgery as the risks outweigh the benefit of opting for conservative management if this were to be a sarcoma. When comparing her most recent ultrasound report to a report from 2017, overall the dimensions of the uterus appear similar. However, the report from 2017 lacks details to further compare the size and appearance of the fibroid, which is another reason why I recommend surgery rather than conversative management. Patient expressed her understanding of the above information.  I discussed at length with the patient the nature, purpose, risks, benefits, and alternatives of exploratory laparotomy, total abdominal hysterectomy and bilateral salpingo-oophorectomy, possible appendectomy.  The patient understands the risks to include (but not be limited to) bowel injury, bleeding (with the possible need for transfusion), bladder or ureteral injury, infections, protracted wound closure, deep venous thrombosis, and arleth-operative death.  She is also aware of  the possibility of  a unrecognized surgical complication with need for subsequent re-exploration.  She understands the implications that removing both ovaries may have on the quality of her life.  She agrees to proceed.  She asked numerous questions which were answered to her satisfaction.  I will reach out to Dr. Cuevas to discuss patient's case and possible combined surgery if he would prefer to perform appendectomy portion.  All questions and concerns were answered to patient's apparent satisfaction.

## 2024-02-07 NOTE — PLAN
[TextEntry] : Exploratory laparotomy, TERENCE, BSO, possible appendectomy Consent signed in office Hysterectomy acknowledgement form signed Medical clearance required by PCP Surgical coordinator to reach out regarding scheduling (@ ) PST to be completed -- CBC, BMP, TYPE & SCREEN, PT/PTT/INR, EKG Light breakfast the day before surgery, followed by clear liquids only Medical release signed in office (most recent colonoscopy report/ pathology)

## 2024-02-07 NOTE — END OF VISIT
[FreeTextEntry3] :  I, Dr. Samira Fatima, personally performed the evaluation and management (E/M) services for this new patient. That E/M includes conducting the initial examination, assessing all conditions, and establishing the plan of care. Today, Ann Ambrose PA-C, was here to observe my evaluation and management services for this patient to be followed going forward.

## 2024-02-07 NOTE — PHYSICAL EXAM
[Chaperone Present] : A chaperone was present in the examining room during all aspects of the physical examination [Normal] : Recto-Vaginal Exam: Normal [Fully active, able to carry on all pre-disease performance without restriction] : Status 0 - Fully active, able to carry on all pre-disease performance without restriction [FreeTextEntry1] : Name of chaperone: Ann Ambrose PA-C. [de-identified] : Limited examination due to intolerance of speculum insertion [de-identified] : no nodularities

## 2024-02-07 NOTE — HISTORY OF PRESENT ILLNESS
[FreeTextEntry1] : 70yo  via C/S x 2, LMP in , referred by [facility] for evaluation of enlarged uterus and uterine mass. Pt presented to [facility] on 1/3/24 with sharp, severe pain. Pt reports she was diagnosed with fibroids at 44yo and just recently became symptomatic. Pt had CT scan which revealed a large uterine mass possible fibroid vs sarcoma and non specific prominent appendix (see report below). She was placed on Flagyl and Cefpodoxime by surgeon, Dr. Cuevas, and was advised to follow up with GYN for hysterectomy with concomitant appendectomy. Pt initially saw her GYN, Dr. Washington who no longer performs surgery with [facility] and referred pt to see Dr. Oralia Smith who then referred her here given possible sarcoma. Denies unintentional weight loss, bleeding, change in bladder or bowel habits, hematochezia. Patient denies any additional episodes of pain since her visit to the ED.  Pelvic US-Oct 2023 - [facility]-compared to 2017, enlarged 17.3cm uterus with partially imaged heterogeneous fibroid noted extending to the upper abdomen difficult to measure on US, component extending into the right lower abdomen measuring 18.4cm with multiple areas of internal cystic change likely representing cystic degeneration, EMS 0.5cm, ovaries not seen and no FF.   CT abd/pelvis -1/3/24-large, heterogeneous, cystic and solid appearing mass in the pelvis measuring 10.5 x 15.5 x 13cm which appears to be originating from the fundus of the uterus with a well -defined border, faintly visualized endometrium appears grossly normal, ovaries believed to be seen and appear normal. Differential of large degenerating fibroid vs sarcoma or endometrial cancer vs less likely ovarian malignancy. MRI advised. Appendix is prominent measuring up to 1.3cm and appears thick walled, the possibility of early uncomplicated appendicitis is not reliably excludable. There is a 3.1 x 1.7 x 1.7cm hypodensity at the lower pole of the left kidney with suggestion of a septation likely septated cyst. Consider US or renal mass protocol MRI or CT, cholelithiasis, tiny 1-2 mm right breast calcification-correlate with dedicated breast imaging.    MRI pelvis -24-20.5 x 8.4 x 16.9cm uterus inclusive of a fundal mass that is separate from the endometrial complex, mass is markedly heterogeneous, although well circumscribed. While sarcoma is difficult to exclude, it is favored to be a benign fibroid with extensive myxoid or cystic degeneration.   Health maintenance:  Pap smear- 2017. Denies history of abnormal pap smears. Mammo- Fall , reports WNL. Colonoscopy- 2019- Benign polyps. Scheduled for repeat 2024. DEXA- Never

## 2024-02-12 ENCOUNTER — OUTPATIENT (OUTPATIENT)
Dept: OUTPATIENT SERVICES | Facility: HOSPITAL | Age: 69
LOS: 1 days | End: 2024-02-12
Payer: MEDICARE

## 2024-02-12 VITALS
SYSTOLIC BLOOD PRESSURE: 150 MMHG | RESPIRATION RATE: 18 BRPM | TEMPERATURE: 98 F | HEIGHT: 62 IN | WEIGHT: 156.09 LBS | DIASTOLIC BLOOD PRESSURE: 71 MMHG | OXYGEN SATURATION: 100 % | HEART RATE: 61 BPM

## 2024-02-12 DIAGNOSIS — Z98.49 CATARACT EXTRACTION STATUS, UNSPECIFIED EYE: Chronic | ICD-10-CM

## 2024-02-12 DIAGNOSIS — Z98.890 OTHER SPECIFIED POSTPROCEDURAL STATES: Chronic | ICD-10-CM

## 2024-02-12 DIAGNOSIS — Z01.818 ENCOUNTER FOR OTHER PREPROCEDURAL EXAMINATION: ICD-10-CM

## 2024-02-12 DIAGNOSIS — Z98.891 HISTORY OF UTERINE SCAR FROM PREVIOUS SURGERY: Chronic | ICD-10-CM

## 2024-02-12 DIAGNOSIS — R19.00 INTRA-ABDOMINAL AND PELVIC SWELLING, MASS AND LUMP, UNSPECIFIED SITE: ICD-10-CM

## 2024-02-12 LAB
A1C WITH ESTIMATED AVERAGE GLUCOSE RESULT: 5.6 % — SIGNIFICANT CHANGE UP (ref 4–5.6)
APTT BLD: 34.3 SEC — SIGNIFICANT CHANGE UP (ref 24.5–35.6)
BASOPHILS # BLD AUTO: 0.03 K/UL — SIGNIFICANT CHANGE UP (ref 0–0.2)
BASOPHILS NFR BLD AUTO: 0.8 % — SIGNIFICANT CHANGE UP (ref 0–2)
BLD GP AB SCN SERPL QL: SIGNIFICANT CHANGE UP
BUN SERPL-MCNC: 12 MG/DL — SIGNIFICANT CHANGE UP (ref 7–23)
CALCIUM SERPL-MCNC: 9.1 MG/DL — SIGNIFICANT CHANGE UP (ref 8.5–10.1)
CHLORIDE SERPL-SCNC: 111 MMOL/L — HIGH (ref 96–108)
CO2 SERPL-SCNC: 28 MMOL/L — SIGNIFICANT CHANGE UP (ref 22–31)
CREAT SERPL-MCNC: 0.74 MG/DL — SIGNIFICANT CHANGE UP (ref 0.5–1.3)
EGFR: 88 ML/MIN/1.73M2 — SIGNIFICANT CHANGE UP
EOSINOPHIL # BLD AUTO: 0.12 K/UL — SIGNIFICANT CHANGE UP (ref 0–0.5)
EOSINOPHIL NFR BLD AUTO: 3.4 % — SIGNIFICANT CHANGE UP (ref 0–6)
ESTIMATED AVERAGE GLUCOSE: 114 MG/DL — SIGNIFICANT CHANGE UP (ref 68–114)
GLUCOSE SERPL-MCNC: 122 MG/DL — HIGH (ref 70–99)
HCT VFR BLD CALC: 44.1 % — SIGNIFICANT CHANGE UP (ref 34.5–45)
HGB BLD-MCNC: 15.3 G/DL — SIGNIFICANT CHANGE UP (ref 11.5–15.5)
IMM GRANULOCYTES NFR BLD AUTO: 0.3 % — SIGNIFICANT CHANGE UP (ref 0–0.9)
INR BLD: 0.98 RATIO — SIGNIFICANT CHANGE UP (ref 0.85–1.18)
LYMPHOCYTES # BLD AUTO: 1.16 K/UL — SIGNIFICANT CHANGE UP (ref 1–3.3)
LYMPHOCYTES # BLD AUTO: 32.6 % — SIGNIFICANT CHANGE UP (ref 13–44)
MAGNESIUM SERPL-MCNC: 2.3 MG/DL — SIGNIFICANT CHANGE UP (ref 1.6–2.6)
MCHC RBC-ENTMCNC: 33.1 PG — SIGNIFICANT CHANGE UP (ref 27–34)
MCHC RBC-ENTMCNC: 34.7 GM/DL — SIGNIFICANT CHANGE UP (ref 32–36)
MCV RBC AUTO: 95.5 FL — SIGNIFICANT CHANGE UP (ref 80–100)
MONOCYTES # BLD AUTO: 0.32 K/UL — SIGNIFICANT CHANGE UP (ref 0–0.9)
MONOCYTES NFR BLD AUTO: 9 % — SIGNIFICANT CHANGE UP (ref 2–14)
NEUTROPHILS # BLD AUTO: 1.92 K/UL — SIGNIFICANT CHANGE UP (ref 1.8–7.4)
NEUTROPHILS NFR BLD AUTO: 53.9 % — SIGNIFICANT CHANGE UP (ref 43–77)
PHOSPHATE SERPL-MCNC: 3.2 MG/DL — SIGNIFICANT CHANGE UP (ref 2.5–4.5)
PLATELET # BLD AUTO: 356 K/UL — SIGNIFICANT CHANGE UP (ref 150–400)
POTASSIUM SERPL-MCNC: 4 MMOL/L — SIGNIFICANT CHANGE UP (ref 3.5–5.3)
POTASSIUM SERPL-SCNC: 4 MMOL/L — SIGNIFICANT CHANGE UP (ref 3.5–5.3)
PROTHROM AB SERPL-ACNC: 11.1 SEC — SIGNIFICANT CHANGE UP (ref 9.5–13)
RBC # BLD: 4.62 M/UL — SIGNIFICANT CHANGE UP (ref 3.8–5.2)
RBC # FLD: 12.9 % — SIGNIFICANT CHANGE UP (ref 10.3–14.5)
SODIUM SERPL-SCNC: 139 MMOL/L — SIGNIFICANT CHANGE UP (ref 135–145)
WBC # BLD: 3.56 K/UL — LOW (ref 3.8–10.5)
WBC # FLD AUTO: 3.56 K/UL — LOW (ref 3.8–10.5)

## 2024-02-12 PROCEDURE — 93010 ELECTROCARDIOGRAM REPORT: CPT

## 2024-02-12 PROCEDURE — 86900 BLOOD TYPING SEROLOGIC ABO: CPT

## 2024-02-12 PROCEDURE — 85610 PROTHROMBIN TIME: CPT

## 2024-02-12 PROCEDURE — 86850 RBC ANTIBODY SCREEN: CPT

## 2024-02-12 PROCEDURE — 85730 THROMBOPLASTIN TIME PARTIAL: CPT

## 2024-02-12 PROCEDURE — 84100 ASSAY OF PHOSPHORUS: CPT

## 2024-02-12 PROCEDURE — 86901 BLOOD TYPING SEROLOGIC RH(D): CPT

## 2024-02-12 PROCEDURE — 83735 ASSAY OF MAGNESIUM: CPT

## 2024-02-12 PROCEDURE — 99214 OFFICE O/P EST MOD 30 MIN: CPT | Mod: 25

## 2024-02-12 PROCEDURE — 85025 COMPLETE CBC W/AUTO DIFF WBC: CPT

## 2024-02-12 PROCEDURE — 80048 BASIC METABOLIC PNL TOTAL CA: CPT

## 2024-02-12 PROCEDURE — 36415 COLL VENOUS BLD VENIPUNCTURE: CPT

## 2024-02-12 PROCEDURE — 83036 HEMOGLOBIN GLYCOSYLATED A1C: CPT

## 2024-02-12 PROCEDURE — 93005 ELECTROCARDIOGRAM TRACING: CPT

## 2024-02-12 NOTE — H&P PST ADULT - NSICDXFAMILYHX_GEN_ALL_CORE_FT
FAMILY HISTORY:  Family history of breast cancer in mother    Father  Still living? Unknown  Family history of colon cancer, Age at diagnosis: Age Unknown

## 2024-02-12 NOTE — H&P PST ADULT - WEIGHT IN KG
You can access the FollowMyHealth Patient Portal offered by [facility] by registering at the following website: [email or web address]. By joining [facility]’s FollowMyHealth portal, you will also be able to view your health information using other applications (apps) compatible with our system.
70.8

## 2024-02-12 NOTE — H&P PST ADULT - NSICDXPASTMEDICALHX_GEN_ALL_CORE_FT
PAST MEDICAL HISTORY:  HTN (hypertension)     Intra-abdominal and pelvic swelling of mass or lump     Leiomyoma of uterus      PAST MEDICAL HISTORY:  BCC (basal cell carcinoma)     HTN (hypertension)     Intra-abdominal and pelvic swelling of mass or lump     LBBB (left bundle branch block)     Leiomyoma of uterus

## 2024-02-12 NOTE — H&P PST ADULT - NSALCOHOLAMT_GEN_A_CORE_SD
[facility] WAITE CALLING AND STATING THEY ARE SWITCHING JOHNS MEDICATIONS TO [facility]. THEY WILL BE CONTACTING OUR OFFICE SO HIS SCRIPTS CAN BE TRANSFERRED OVER. I ADDED INTO THE CHART ALREADY.    ALSO WANT TO KNOW IF YOU WILL WRITE SCRIPT FOR A DEXACOM METER/PATCH. NEEDS SENT TO [facility]      [phone] MANUEL          1-2 drinks

## 2024-02-12 NOTE — H&P PST ADULT - NSICDXPASTSURGICALHX_GEN_ALL_CORE_FT
PAST SURGICAL HISTORY:  History of  section      PAST SURGICAL HISTORY:  History of basal cell carcinoma (BCC) excision     History of  section     S/P cataract surgery

## 2024-02-12 NOTE — H&P PST ADULT - HISTORY OF PRESENT ILLNESS
70 y/o female with uterine fibroid She is scheduled for Exploratory laparotomy, total abdominal hysterectomy, b/l salpingo oophorectomy, possible appendectomy.  68 y/o female with uterine fibroid, abdominal and pelvic mass, PMH of HTN. Pt reports she had lower abdominal pain, nausea, vomiting, she went to ER at [facility] on 01/03/2024, abdominal imaging studies showed uterine, abdominal mass. She is scheduled for Exploratory laparotomy, total abdominal hysterectomy, b/l salpingo oophorectomy, possible appendectomy. Pt had cardiac optimization for surgery while at [facility], copy of notes in chart. Pt denies abdominal pain, no vaginal bleeding, no fever at present.

## 2024-02-12 NOTE — H&P PST ADULT - ASSESSMENT
68 y/o female with uterine fibroid, abdominal and pelvic mass, PMH of HTN. She is scheduled for Exploratory laparotomy, total abdominal hysterectomy, b/l salpingo oophorectomy, possible appendectomy.   Plan  1. Stop all NSAIDS, herbal supplements and vitamins for 7 days.  2. NPO as per ASU instructions  3. Take the following medication ( Amlodipine ) with small sips of water on the morning of your procedure/surgery.  4. Use EZ sponges as directed  5. Labs, EKG as per surgeon.   6. PMD visit for optimization prior to surgery as per surgeon.   7. Cardiac optimization notes on chart.  8. Bowel prep as per surgeon's instructions    CAPRINI VTE 2.0 SCORE [CLOT updated 2019]    AGE RELATED RISK FACTORS                                                       MOBILITY RELATED FACTORS  [ ] Age 41-60 years                                            (1 Point)                    [ ] Bed rest                                                        (1 Point)  [ x] Age: 61-74 years                                           (2 Points)                  [ ] Plaster cast                                                   (2 Points)  [ ] Age= 75 years                                              (3 Points)                    [ ] Bed bound for more than 72 hours                 (2 Points)    DISEASE RELATED RISK FACTORS                                               GENDER SPECIFIC FACTORS  [ ] Edema in the lower extremities                       (1 Point)              [ ] Pregnancy                                                     (1 Point)  [ ] Varicose veins                                               (1 Point)                     [ ] Post-partum < 6 weeks                                   (1 Point)             [ x] BMI > 25 Kg/m2                                            (1 Point)                     [ ] Hormonal therapy  or oral contraception          (1 Point)                 [ ] Sepsis (in the previous month)                        (1 Point)               [ ] History of pregnancy complications                 (1 point)  [ ] Pneumonia or serious lung disease                                               [ ] Unexplained or recurrent                     (1 Point)           (in the previous month)                               (1 Point)  [ ] Abnormal pulmonary function test                     (1 Point)                 SURGERY RELATED RISK FACTORS  [ ] Acute myocardial infarction                              (1 Point)               [ ]  Section                                             (1 Point)  [ ] Congestive heart failure (in the previous month)  (1 Point)      [ ] Minor surgery                                                  (1 Point)   [ ] Inflammatory bowel disease                             (1 Point)               [ ] Arthroscopic surgery                                        (2 Points)  [ ] Central venous access                                      (2 Points)                [x ] General surgery lasting more than 45 minutes (2 points)  [ ] Malignancy- Present or previous                   (2 Points)                [ ] Elective arthroplasty                                         (5 points)    [ ] Stroke (in the previous month)                          (5 Points)                                                                                                                                                           HEMATOLOGY RELATED FACTORS                                                 TRAUMA RELATED RISK FACTORS  [ ] Prior episodes of VTE                                     (3 Points)                [ ] Fracture of the hip, pelvis, or leg                       (5 Points)  [ ] Positive family history for VTE                         (3 Points)             [ ] Acute spinal cord injury (in the previous month)  (5 Points)  [ ] Prothrombin 39235 A                                     (3 Points)               [ ] Paralysis  (less than 1 month)                             (5 Points)  [ ] Factor V Leiden                                             (3 Points)                  [ ] Multiple Trauma within 1 month                        (5 Points)  [ ] Lupus anticoagulants                                     (3 Points)                                                           [ ] Anticardiolipin antibodies                               (3 Points)                                                       [ ] High homocysteine in the blood                      (3 Points)                                             [ ] Other congenital or acquired thrombophilia      (3 Points)                                                [ ] Heparin induced thrombocytopenia                  (3 Points)                                     Total Score [     5     ]  The Caprini score indicates this patient is at risk for a VTE event (score 3-5).  Most surgical patients in this group would benefit from pharmacologic prophylaxis.  The surgical team will determine the balance between VTE risk and bleeding risk

## 2024-02-12 NOTE — H&P PST ADULT - ATTENDING COMMENTS
Patient seen in the presurgical holding area with her  present for the informed consent discussion.  R/B/A were reviewed & understood; consent is signed & witnessed in the chart.

## 2024-02-13 DIAGNOSIS — R19.00 INTRA-ABDOMINAL AND PELVIC SWELLING, MASS AND LUMP, UNSPECIFIED SITE: ICD-10-CM

## 2024-02-13 DIAGNOSIS — Z01.818 ENCOUNTER FOR OTHER PREPROCEDURAL EXAMINATION: ICD-10-CM

## 2024-02-23 ENCOUNTER — INPATIENT (INPATIENT)
Facility: HOSPITAL | Age: 69
LOS: 2 days | Discharge: HOME | DRG: 742 | End: 2024-02-26
Attending: OBSTETRICS & GYNECOLOGY | Admitting: OBSTETRICS & GYNECOLOGY
Payer: MEDICARE

## 2024-02-23 ENCOUNTER — RESULT REVIEW (OUTPATIENT)
Age: 69
End: 2024-02-23

## 2024-02-23 VITALS
TEMPERATURE: 97 F | WEIGHT: 154.1 LBS | HEART RATE: 86 BPM | RESPIRATION RATE: 16 BRPM | OXYGEN SATURATION: 99 % | HEIGHT: 62 IN | SYSTOLIC BLOOD PRESSURE: 146 MMHG | DIASTOLIC BLOOD PRESSURE: 91 MMHG

## 2024-02-23 DIAGNOSIS — D25.9 LEIOMYOMA OF UTERUS, UNSPECIFIED: ICD-10-CM

## 2024-02-23 DIAGNOSIS — Z98.49 CATARACT EXTRACTION STATUS, UNSPECIFIED EYE: Chronic | ICD-10-CM

## 2024-02-23 DIAGNOSIS — Z98.891 HISTORY OF UTERINE SCAR FROM PREVIOUS SURGERY: Chronic | ICD-10-CM

## 2024-02-23 DIAGNOSIS — R19.00 INTRA-ABDOMINAL AND PELVIC SWELLING, MASS AND LUMP, UNSPECIFIED SITE: ICD-10-CM

## 2024-02-23 DIAGNOSIS — Z98.890 OTHER SPECIFIED POSTPROCEDURAL STATES: Chronic | ICD-10-CM

## 2024-02-23 LAB
GLUCOSE BLDC GLUCOMTR-MCNC: 129 MG/DL — HIGH (ref 70–99)
GLUCOSE BLDC GLUCOMTR-MCNC: 92 MG/DL — SIGNIFICANT CHANGE UP (ref 70–99)

## 2024-02-23 PROCEDURE — 83735 ASSAY OF MAGNESIUM: CPT

## 2024-02-23 PROCEDURE — 58150 TOTAL HYSTERECTOMY: CPT

## 2024-02-23 PROCEDURE — 88307 TISSUE EXAM BY PATHOLOGIST: CPT

## 2024-02-23 PROCEDURE — 36415 COLL VENOUS BLD VENIPUNCTURE: CPT

## 2024-02-23 PROCEDURE — 88304 TISSUE EXAM BY PATHOLOGIST: CPT | Mod: 26

## 2024-02-23 PROCEDURE — 84100 ASSAY OF PHOSPHORUS: CPT

## 2024-02-23 PROCEDURE — C9399: CPT

## 2024-02-23 PROCEDURE — 88307 TISSUE EXAM BY PATHOLOGIST: CPT | Mod: 26

## 2024-02-23 PROCEDURE — 80048 BASIC METABOLIC PNL TOTAL CA: CPT

## 2024-02-23 PROCEDURE — 85025 COMPLETE CBC W/AUTO DIFF WBC: CPT

## 2024-02-23 PROCEDURE — 82962 GLUCOSE BLOOD TEST: CPT

## 2024-02-23 PROCEDURE — 88304 TISSUE EXAM BY PATHOLOGIST: CPT

## 2024-02-23 PROCEDURE — 44955 APPENDECTOMY ADD-ON: CPT

## 2024-02-23 RX ORDER — HYDROMORPHONE HYDROCHLORIDE 2 MG/ML
1 INJECTION INTRAMUSCULAR; INTRAVENOUS; SUBCUTANEOUS
Refills: 0 | Status: DISCONTINUED | OUTPATIENT
Start: 2024-02-23 | End: 2024-02-23

## 2024-02-23 RX ORDER — LOSARTAN POTASSIUM 100 MG/1
50 TABLET, FILM COATED ORAL DAILY
Refills: 0 | Status: DISCONTINUED | OUTPATIENT
Start: 2024-02-23 | End: 2024-02-26

## 2024-02-23 RX ORDER — FENTANYL CITRATE 50 UG/ML
50 INJECTION INTRAVENOUS
Refills: 0 | Status: DISCONTINUED | OUTPATIENT
Start: 2024-02-23 | End: 2024-02-23

## 2024-02-23 RX ORDER — CEFAZOLIN SODIUM 1 G
2000 VIAL (EA) INJECTION ONCE
Refills: 0 | Status: COMPLETED | OUTPATIENT
Start: 2024-02-23 | End: 2024-02-23

## 2024-02-23 RX ORDER — SODIUM CHLORIDE 9 MG/ML
1000 INJECTION, SOLUTION INTRAVENOUS
Refills: 0 | Status: DISCONTINUED | OUTPATIENT
Start: 2024-02-23 | End: 2024-02-23

## 2024-02-23 RX ORDER — AMLODIPINE BESYLATE 2.5 MG/1
5 TABLET ORAL DAILY
Refills: 0 | Status: DISCONTINUED | OUTPATIENT
Start: 2024-02-23 | End: 2024-02-26

## 2024-02-23 RX ORDER — OXYCODONE HYDROCHLORIDE 5 MG/1
10 TABLET ORAL EVERY 4 HOURS
Refills: 0 | Status: DISCONTINUED | OUTPATIENT
Start: 2024-02-23 | End: 2024-02-26

## 2024-02-23 RX ORDER — OXYCODONE HYDROCHLORIDE 5 MG/1
5 TABLET ORAL ONCE
Refills: 0 | Status: DISCONTINUED | OUTPATIENT
Start: 2024-02-23 | End: 2024-02-23

## 2024-02-23 RX ORDER — OXYCODONE HYDROCHLORIDE 5 MG/1
5 TABLET ORAL EVERY 4 HOURS
Refills: 0 | Status: DISCONTINUED | OUTPATIENT
Start: 2024-02-23 | End: 2024-02-26

## 2024-02-23 RX ORDER — HEPARIN SODIUM 5000 [USP'U]/ML
5000 INJECTION INTRAVENOUS; SUBCUTANEOUS EVERY 8 HOURS
Refills: 0 | Status: DISCONTINUED | OUTPATIENT
Start: 2024-02-23 | End: 2024-02-26

## 2024-02-23 RX ORDER — SODIUM CHLORIDE 9 MG/ML
1000 INJECTION, SOLUTION INTRAVENOUS
Refills: 0 | Status: DISCONTINUED | OUTPATIENT
Start: 2024-02-23 | End: 2024-02-24

## 2024-02-23 RX ORDER — HEPARIN SODIUM 5000 [USP'U]/ML
5000 INJECTION INTRAVENOUS; SUBCUTANEOUS ONCE
Refills: 0 | Status: COMPLETED | OUTPATIENT
Start: 2024-02-23 | End: 2024-02-23

## 2024-02-23 RX ORDER — IBUPROFEN 200 MG
600 TABLET ORAL EVERY 6 HOURS
Refills: 0 | Status: DISCONTINUED | OUTPATIENT
Start: 2024-02-23 | End: 2024-02-26

## 2024-02-23 RX ORDER — ACETAMINOPHEN 500 MG
975 TABLET ORAL EVERY 6 HOURS
Refills: 0 | Status: DISCONTINUED | OUTPATIENT
Start: 2024-02-23 | End: 2024-02-26

## 2024-02-23 RX ORDER — CELECOXIB 200 MG/1
400 CAPSULE ORAL ONCE
Refills: 0 | Status: COMPLETED | OUTPATIENT
Start: 2024-02-23 | End: 2024-02-23

## 2024-02-23 RX ORDER — ONDANSETRON 8 MG/1
4 TABLET, FILM COATED ORAL ONCE
Refills: 0 | Status: DISCONTINUED | OUTPATIENT
Start: 2024-02-23 | End: 2024-02-23

## 2024-02-23 RX ORDER — AMLODIPINE BESYLATE AND OLMESARTRAN MEDOXOMIL 10; 40 MG/1; MG/1
1 TABLET, FILM COATED ORAL
Refills: 0 | DISCHARGE

## 2024-02-23 RX ORDER — ONDANSETRON 8 MG/1
4 TABLET, FILM COATED ORAL ONCE
Refills: 0 | Status: COMPLETED | OUTPATIENT
Start: 2024-02-23 | End: 2024-02-23

## 2024-02-23 RX ORDER — ONDANSETRON 8 MG/1
4 TABLET, FILM COATED ORAL EVERY 6 HOURS
Refills: 0 | Status: DISCONTINUED | OUTPATIENT
Start: 2024-02-23 | End: 2024-02-24

## 2024-02-23 RX ORDER — ACETAMINOPHEN 500 MG
1000 TABLET ORAL ONCE
Refills: 0 | Status: COMPLETED | OUTPATIENT
Start: 2024-02-23 | End: 2024-02-23

## 2024-02-23 RX ORDER — CEFAZOLIN SODIUM 1 G
2000 VIAL (EA) INJECTION ONCE
Refills: 0 | Status: DISCONTINUED | OUTPATIENT
Start: 2024-02-23 | End: 2024-02-23

## 2024-02-23 RX ORDER — GABAPENTIN 400 MG/1
300 CAPSULE ORAL ONCE
Refills: 0 | Status: COMPLETED | OUTPATIENT
Start: 2024-02-23 | End: 2024-02-23

## 2024-02-23 RX ORDER — METRONIDAZOLE 500 MG
500 TABLET ORAL ONCE
Refills: 0 | Status: COMPLETED | OUTPATIENT
Start: 2024-02-23 | End: 2024-02-23

## 2024-02-23 RX ADMIN — CELECOXIB 400 MILLIGRAM(S): 200 CAPSULE ORAL at 12:23

## 2024-02-23 RX ADMIN — Medication 1000 MILLIGRAM(S): at 12:58

## 2024-02-23 RX ADMIN — HEPARIN SODIUM 5000 UNIT(S): 5000 INJECTION INTRAVENOUS; SUBCUTANEOUS at 12:59

## 2024-02-23 RX ADMIN — Medication 975 MILLIGRAM(S): at 23:39

## 2024-02-23 RX ADMIN — Medication 975 MILLIGRAM(S): at 02:33

## 2024-02-23 RX ADMIN — CELECOXIB 400 MILLIGRAM(S): 200 CAPSULE ORAL at 12:58

## 2024-02-23 RX ADMIN — ONDANSETRON 4 MILLIGRAM(S): 8 TABLET, FILM COATED ORAL at 22:48

## 2024-02-23 RX ADMIN — HYDROMORPHONE HYDROCHLORIDE 1 MILLIGRAM(S): 2 INJECTION INTRAMUSCULAR; INTRAVENOUS; SUBCUTANEOUS at 20:10

## 2024-02-23 RX ADMIN — HYDROMORPHONE HYDROCHLORIDE 1 MILLIGRAM(S): 2 INJECTION INTRAMUSCULAR; INTRAVENOUS; SUBCUTANEOUS at 20:25

## 2024-02-23 RX ADMIN — Medication 1000 MILLIGRAM(S): at 12:24

## 2024-02-23 RX ADMIN — HYDROMORPHONE HYDROCHLORIDE 1 MILLIGRAM(S): 2 INJECTION INTRAMUSCULAR; INTRAVENOUS; SUBCUTANEOUS at 19:50

## 2024-02-23 RX ADMIN — GABAPENTIN 300 MILLIGRAM(S): 400 CAPSULE ORAL at 12:24

## 2024-02-23 RX ADMIN — HYDROMORPHONE HYDROCHLORIDE 1 MILLIGRAM(S): 2 INJECTION INTRAMUSCULAR; INTRAVENOUS; SUBCUTANEOUS at 20:05

## 2024-02-23 NOTE — BRIEF OPERATIVE NOTE - NSICDXBRIEFPREOP_GEN_ALL_CORE_FT
PRE-OP DIAGNOSIS:  History of appendicitis 23-Feb-2024 19:11:22  Tianna Seth  Uterine mass 23-Feb-2024 19:10:49  Tianna Seth

## 2024-02-23 NOTE — BRIEF OPERATIVE NOTE - NSICDXBRIEFPROCEDURE_GEN_ALL_CORE_FT
PROCEDURES:  Hysterectomy, total, abdominal, with BSO 23-Feb-2024 19:10:06  Tianna Seth  Appendectomy 23-Feb-2024 19:10:14  Tianna Seth

## 2024-02-23 NOTE — BRIEF OPERATIVE NOTE - OPERATION/FINDINGS
Normal appearing external genitalia. Large approximately 18wk size very mobile uterus on bimanual. Abdominal findings noted globular, soft, enlarged uterus. Normal appearing bilateral adnexa. Minimal scarring/adhesions from prior surgeries. Appendix normal in appearance other than a dilated distal end of approximate 1.5cm. Uterus opened off the table and cystic degeneration noted with multiple fluid pockets. Excellent hemostasis. TAPs block prior to beginning.

## 2024-02-24 ENCOUNTER — TRANSCRIPTION ENCOUNTER (OUTPATIENT)
Age: 69
End: 2024-02-24

## 2024-02-24 LAB
ANION GAP SERPL CALC-SCNC: 8 MMOL/L — SIGNIFICANT CHANGE UP (ref 5–17)
BASOPHILS # BLD AUTO: 0.01 K/UL — SIGNIFICANT CHANGE UP (ref 0–0.2)
BASOPHILS NFR BLD AUTO: 0.1 % — SIGNIFICANT CHANGE UP (ref 0–2)
BUN SERPL-MCNC: 7 MG/DL — SIGNIFICANT CHANGE UP (ref 7–23)
CALCIUM SERPL-MCNC: 8.3 MG/DL — LOW (ref 8.5–10.1)
CHLORIDE SERPL-SCNC: 104 MMOL/L — SIGNIFICANT CHANGE UP (ref 96–108)
CO2 SERPL-SCNC: 24 MMOL/L — SIGNIFICANT CHANGE UP (ref 22–31)
CREAT SERPL-MCNC: 0.56 MG/DL — SIGNIFICANT CHANGE UP (ref 0.5–1.3)
EGFR: 99 ML/MIN/1.73M2 — SIGNIFICANT CHANGE UP
EOSINOPHIL # BLD AUTO: 0 K/UL — SIGNIFICANT CHANGE UP (ref 0–0.5)
EOSINOPHIL NFR BLD AUTO: 0 % — SIGNIFICANT CHANGE UP (ref 0–6)
GLUCOSE BLDC GLUCOMTR-MCNC: 136 MG/DL — HIGH (ref 70–99)
GLUCOSE BLDC GLUCOMTR-MCNC: 136 MG/DL — HIGH (ref 70–99)
GLUCOSE SERPL-MCNC: 134 MG/DL — HIGH (ref 70–99)
HCT VFR BLD CALC: 40.4 % — SIGNIFICANT CHANGE UP (ref 34.5–45)
HGB BLD-MCNC: 13.9 G/DL — SIGNIFICANT CHANGE UP (ref 11.5–15.5)
IMM GRANULOCYTES NFR BLD AUTO: 0.4 % — SIGNIFICANT CHANGE UP (ref 0–0.9)
LYMPHOCYTES # BLD AUTO: 0.67 K/UL — LOW (ref 1–3.3)
LYMPHOCYTES # BLD AUTO: 10 % — LOW (ref 13–44)
MAGNESIUM SERPL-MCNC: 2 MG/DL — SIGNIFICANT CHANGE UP (ref 1.6–2.6)
MCHC RBC-ENTMCNC: 32.6 PG — SIGNIFICANT CHANGE UP (ref 27–34)
MCHC RBC-ENTMCNC: 34.4 GM/DL — SIGNIFICANT CHANGE UP (ref 32–36)
MCV RBC AUTO: 94.8 FL — SIGNIFICANT CHANGE UP (ref 80–100)
MONOCYTES # BLD AUTO: 0.61 K/UL — SIGNIFICANT CHANGE UP (ref 0–0.9)
MONOCYTES NFR BLD AUTO: 9.1 % — SIGNIFICANT CHANGE UP (ref 2–14)
NEUTROPHILS # BLD AUTO: 5.39 K/UL — SIGNIFICANT CHANGE UP (ref 1.8–7.4)
NEUTROPHILS NFR BLD AUTO: 80.4 % — HIGH (ref 43–77)
PHOSPHATE SERPL-MCNC: 2.8 MG/DL — SIGNIFICANT CHANGE UP (ref 2.5–4.5)
PLATELET # BLD AUTO: 345 K/UL — SIGNIFICANT CHANGE UP (ref 150–400)
POTASSIUM SERPL-MCNC: 3.8 MMOL/L — SIGNIFICANT CHANGE UP (ref 3.5–5.3)
POTASSIUM SERPL-SCNC: 3.8 MMOL/L — SIGNIFICANT CHANGE UP (ref 3.5–5.3)
RBC # BLD: 4.26 M/UL — SIGNIFICANT CHANGE UP (ref 3.8–5.2)
RBC # FLD: 12.4 % — SIGNIFICANT CHANGE UP (ref 10.3–14.5)
SODIUM SERPL-SCNC: 136 MMOL/L — SIGNIFICANT CHANGE UP (ref 135–145)
WBC # BLD: 6.71 K/UL — SIGNIFICANT CHANGE UP (ref 3.8–10.5)
WBC # FLD AUTO: 6.71 K/UL — SIGNIFICANT CHANGE UP (ref 3.8–10.5)

## 2024-02-24 RX ORDER — POTASSIUM PHOSPHATE, MONOBASIC POTASSIUM PHOSPHATE, DIBASIC 236; 224 MG/ML; MG/ML
15 INJECTION, SOLUTION INTRAVENOUS ONCE
Refills: 0 | Status: COMPLETED | OUTPATIENT
Start: 2024-02-24 | End: 2024-02-24

## 2024-02-24 RX ORDER — ONDANSETRON 8 MG/1
4 TABLET, FILM COATED ORAL EVERY 6 HOURS
Refills: 0 | Status: DISCONTINUED | OUTPATIENT
Start: 2024-02-24 | End: 2024-02-26

## 2024-02-24 RX ORDER — POLYETHYLENE GLYCOL 3350 17 G/17G
17 POWDER, FOR SOLUTION ORAL DAILY
Refills: 0 | Status: DISCONTINUED | OUTPATIENT
Start: 2024-02-24 | End: 2024-02-26

## 2024-02-24 RX ADMIN — HEPARIN SODIUM 5000 UNIT(S): 5000 INJECTION INTRAVENOUS; SUBCUTANEOUS at 22:01

## 2024-02-24 RX ADMIN — Medication 975 MILLIGRAM(S): at 17:34

## 2024-02-24 RX ADMIN — HEPARIN SODIUM 5000 UNIT(S): 5000 INJECTION INTRAVENOUS; SUBCUTANEOUS at 12:14

## 2024-02-24 RX ADMIN — Medication 975 MILLIGRAM(S): at 13:13

## 2024-02-24 RX ADMIN — POTASSIUM PHOSPHATE, MONOBASIC POTASSIUM PHOSPHATE, DIBASIC 62.5 MILLIMOLE(S): 236; 224 INJECTION, SOLUTION INTRAVENOUS at 12:17

## 2024-02-24 RX ADMIN — Medication 975 MILLIGRAM(S): at 18:34

## 2024-02-24 RX ADMIN — ONDANSETRON 4 MILLIGRAM(S): 8 TABLET, FILM COATED ORAL at 18:48

## 2024-02-24 RX ADMIN — Medication 975 MILLIGRAM(S): at 06:32

## 2024-02-24 RX ADMIN — HEPARIN SODIUM 5000 UNIT(S): 5000 INJECTION INTRAVENOUS; SUBCUTANEOUS at 06:33

## 2024-02-24 RX ADMIN — Medication 975 MILLIGRAM(S): at 12:13

## 2024-02-24 RX ADMIN — POLYETHYLENE GLYCOL 3350 17 GRAM(S): 17 POWDER, FOR SOLUTION ORAL at 17:34

## 2024-02-24 RX ADMIN — LOSARTAN POTASSIUM 50 MILLIGRAM(S): 100 TABLET, FILM COATED ORAL at 12:11

## 2024-02-24 RX ADMIN — AMLODIPINE BESYLATE 5 MILLIGRAM(S): 2.5 TABLET ORAL at 12:12

## 2024-02-24 NOTE — DISCHARGE NOTE PROVIDER - CARE PROVIDER_API CALL
Samira Fatima  Gynecologic Oncology  [address] [place] [address]  Phone: [phone]  Fax: [phone]  Follow Up Time: 1 week

## 2024-02-24 NOTE — DIETITIAN INITIAL EVALUATION ADULT - NUTRITION DIAGNOSIS
Emergency Department    [address] [facility] [address]    Phone:  [phone]    Fax:  [phone]                                       Samira Peralta   MRN: [id number]    Department:   Emergency Department   Date of Visit:  6/26/2018           After Visit Summary Signature Page     I have received my discharge instructions, and my questions have been answered. I have discussed any challenges I see with this plan with the nurse or doctor.    ..........................................................................................................................................  Patient/Patient Representative Signature      ..........................................................................................................................................  Patient Representative Print Name and Relationship to Patient    ..................................................               ................................................  Date                                            Time    ..........................................................................................................................................  Reviewed by Signature/Title    ...................................................              ..............................................  Date                                                            Time           yes...

## 2024-02-24 NOTE — PROGRESS NOTE ADULT - SUBJECTIVE AND OBJECTIVE BOX
GYNECOLOGIC ONCOLOGY PROGRESS NOTE    POD#1    PROBLEMS:  1. Pelvic mass  2. Fibroid uterus    Pt seen and examined at bedside.     SUBJECTIVE:    Reports nausea and vomiting x1 O/N.  Pain well-controlled.  - flatus, -voiding  Not yet attempted PO diet.  Not yet ambulating.     OBJECTIVE:     VITALS:  T(F): 98.2 (02-24-24 @ 04:00), Max: 99 (02-23-24 @ 19:05)  HR: 92 (02-24-24 @ 04:00) (56 - 95)  BP: 134/75 (02-24-24 @ 04:00) (121/68 - 146/91)  RR: 16 (02-24-24 @ 04:00) (11 - 19)  SpO2: 98% (02-24-24 @ 04:00) (93% - 99%    I&O's Summary    23 Feb 2024 07:01  -  24 Feb 2024 07:00  --------------------------------------------------------  IN: 2053 mL / OUT: 1580 mL / NET: 473 mL    MEDICATIONS  (STANDING):  acetaminophen     Tablet .. 975 milliGRAM(s) Oral every 6 hours  amLODIPine   Tablet 5 milliGRAM(s) Oral daily  heparin   Injectable 5000 Unit(s) SubCutaneous every 8 hours  lactated ringers. 1000 milliLiter(s) (104 mL/Hr) IV Continuous <Continuous>  losartan 50 milliGRAM(s) Oral daily    MEDICATIONS  (PRN):  ibuprofen  Tablet. 600 milliGRAM(s) Oral every 6 hours PRN Mild Pain (1 - 3)  ondansetron Injectable 4 milliGRAM(s) IV Push every 6 hours PRN Nausea and/or Vomiting  oxyCODONE    IR 5 milliGRAM(s) Oral every 4 hours PRN Moderate Pain (4 - 6)  oxyCODONE    IR 10 milliGRAM(s) Oral every 4 hours PRN Severe Pain (7 - 10)    Physical Exam:  Constitutional: NAD  Pulmonary: unlabored respirations  Abdomen: soft, non-tender, non-distended, normal bowel sounds  Extremities: no lower extremity edema or calve tenderness, Gonzales's sign negative.  Incision: Clean, dry, intact with dressing in place. Without signs of infection or hernia.    LABS:       GYNECOLOGIC ONCOLOGY PROGRESS NOTE    POD#1    PROBLEMS:  1. Pelvic mass  2. Fibroid uterus    Pt seen and examined at bedside.     SUBJECTIVE:    Reports nausea and vomiting x1 O/N.  Pain well-controlled.  - flatus, - voiding  Not yet attempted PO diet.  Not yet ambulating.     OBJECTIVE:     VITALS:  T(F): 98.2 (02-24-24 @ 04:00), Max: 99 (02-23-24 @ 19:05)  HR: 92 (02-24-24 @ 04:00) (56 - 95)  BP: 134/75 (02-24-24 @ 04:00) (121/68 - 146/91)  RR: 16 (02-24-24 @ 04:00) (11 - 19)  SpO2: 98% (02-24-24 @ 04:00) (93% - 99%    I&O's Summary    23 Feb 2024 07:01  -  24 Feb 2024 07:00  --------------------------------------------------------  IN: 2053 mL / OUT: 1580 mL / NET: 473 mL    MEDICATIONS  (STANDING):  acetaminophen     Tablet .. 975 milliGRAM(s) Oral every 6 hours  amLODIPine   Tablet 5 milliGRAM(s) Oral daily  heparin   Injectable 5000 Unit(s) SubCutaneous every 8 hours  lactated ringers. 1000 milliLiter(s) (104 mL/Hr) IV Continuous <Continuous>  losartan 50 milliGRAM(s) Oral daily    MEDICATIONS  (PRN):  ibuprofen  Tablet. 600 milliGRAM(s) Oral every 6 hours PRN Mild Pain (1 - 3)  ondansetron Injectable 4 milliGRAM(s) IV Push every 6 hours PRN Nausea and/or Vomiting  oxyCODONE    IR 5 milliGRAM(s) Oral every 4 hours PRN Moderate Pain (4 - 6)  oxyCODONE    IR 10 milliGRAM(s) Oral every 4 hours PRN Severe Pain (7 - 10)    Physical Exam:  Constitutional: NAD  Pulmonary: unlabored respirations  Abdomen: soft, non-tender, non-distended, normal bowel sounds  Extremities: no lower extremity edema or calve tenderness, Gonzales's sign negative.  Incision: Clean, dry, intact with dressing in place. Without signs of infection or hernia.    LABS:

## 2024-02-24 NOTE — DIETITIAN INITIAL EVALUATION ADULT - OTHER INFO
70 y/o female with uterine fibroid, abdominal and pelvic mass, PMH of HTN. Pt reports she had lower abdominal pain, nausea, vomiting, she went to ER at [facility] on 01/03/2024, abdominal imaging studies showed uterine, abdominal mass. She is scheduled for Exploratory laparotomy, total abdominal hysterectomy, b/l salpingo oophorectomy, possible appendectomy. Pt had cardiac optimization for surgery while at [facility]  Admitted for uterine mass    Prescribed a DASH/TLC diet.  Reports actively trying to lose weight using WW. No weight history to review however reports weight in October 2023 was 168# and CBW is 155#.  Intentional loss of 13#/8% x 4 months - not clinically significant.  Pt reported goal wt is 130#.  Based on bed scale wt obtained by RD on 2/24 pt wt is 159.3#.  Patient appears overweight.  NFPE negative for findings.  Education provided for reduced alcohol intake, need for adequate energy intake with healthy wt loss goals.  Recommend to Liberalize diet to regular to maximize caloric and nutrient intake.  See recommendations below.

## 2024-02-24 NOTE — ANESTHESIA FOLLOW-UP NOTE - NSEVALATIONFT_GEN_ALL_CORE
ICU Vital Signs Last 24 Hrs  T(C): 36.9 (24 Feb 2024 11:58), Max: 37.2 (23 Feb 2024 19:05)  T(F): 98.5 (24 Feb 2024 11:58), Max: 99 (23 Feb 2024 19:05)  HR: 99 (24 Feb 2024 11:58) (56 - 99)  BP: 151/66 (24 Feb 2024 11:58) (121/68 - 151/66)  BP(mean): --  ABP: --  ABP(mean): --  RR: 17 (24 Feb 2024 11:58) (11 - 19)  SpO2: 99% (24 Feb 2024 11:58) (93% - 99%)    O2 Parameters below as of 24 Feb 2024 11:58  Patient On (Oxygen Delivery Method): room air

## 2024-02-24 NOTE — DIETITIAN INITIAL EVALUATION ADULT - PERTINENT LABORATORY DATA
02-24    136  |  104  |  7   ----------------------------<  134<H>  3.8   |  24  |  0.56    Ca    8.3<L>      24 Feb 2024 08:03  Phos  2.8     02-24  Mg     2.0     02-24    POCT Blood Glucose.: 136 mg/dL (02-24-24 @ 06:34)  A1C with Estimated Average Glucose Result: 5.6 % (02-12-24 @ 10:42)

## 2024-02-24 NOTE — DIETITIAN INITIAL EVALUATION ADULT - NSFNSGIIOFT_GEN_A_CORE
I&O's Detail    23 Feb 2024 07:01  -  24 Feb 2024 07:00  --------------------------------------------------------  IN:    Lactated Ringers: 253 mL    Other (mL): 1800 mL  Total IN: 2053 mL    OUT:    Indwelling Catheter - Urethral (mL): 1580 mL  Total OUT: 1580 mL    Total NET: 473 mL      24 Feb 2024 07:01  -  24 Feb 2024 10:35  --------------------------------------------------------  IN:  Total IN: 0 mL    OUT:    Indwelling Catheter - Urethral (mL): 225 mL    Voided (mL): 300 mL  Total OUT: 525 mL    Total NET: -525 mL

## 2024-02-24 NOTE — DISCHARGE NOTE PROVIDER - NSDCMRMEDTOKEN_GEN_ALL_CORE_FT
amlodipine-olmesartan 5 mg-20 mg oral tablet: 1 tab(s) orally once a day   amlodipine-olmesartan 5 mg-20 mg oral tablet: 1 tab(s) orally once a day  blood pressure cuff: Take bp prior to taking BP medication. If &lt;110/60 hold medication and recheck in 1 hour. Take BP if symptoms such as lightheadedness, dizziness.

## 2024-02-24 NOTE — DISCHARGE NOTE PROVIDER - HOSPITAL COURSE
Patient admitted following Ex-LAP, TERENCE, BSO, and appendectomy via jeremias, tap block. She was transferred from PACU recovery to the floor in stable condition. Her hospital stay was uncomplicated. Upon discharge she was ambulating, voiding, and tolerating PO. Pain well controlled with prn pain meds.

## 2024-02-24 NOTE — DIETITIAN INITIAL EVALUATION ADULT - ADD RECOMMEND
1) Liberalize diet to regular to maximize caloric and nutrient intake.   2) Encourage protein-rich foods, maximize food preferences   3) MVI w/ minerals daily to ensure 100% RDA met   4) Monitor bowel movements, if no BM for >3 days, consider implementing bowel regimen.   5) Obtain weekly wt to track/trend changes   6) Confirm goals of care regarding nutrition support   RD will continue to monitor PO intake, labs, hydration, and wt prn.

## 2024-02-24 NOTE — DISCHARGE NOTE PROVIDER - NSDCCPTREATMENT_GEN_ALL_CORE_FT
PRINCIPAL PROCEDURE  Procedure: Hysterectomy, total, abdominal, with BSO  Findings and Treatment:       SECONDARY PROCEDURE  Procedure: Appendectomy  Findings and Treatment:

## 2024-02-24 NOTE — DIETITIAN INITIAL EVALUATION ADULT - ORAL INTAKE PTA/DIET HISTORY
Lives with  and she does all of the food shopping and cooking.  Endorses 3 meals per day and follows weight watchers diet.  Diet includes fish, chicken, some red meat, melons and berries, broccoli, coffee and wine daily.  Reports no PO intake since Wednesday and persistent nausea and vomiting.  Likely meeting <50% ENN 2/2 N/V.

## 2024-02-24 NOTE — DISCHARGE NOTE PROVIDER - NSDCFUADDINST_GEN_ALL_CORE_FT
- Please call the office for a follow-up with your doctor in 1 weeks.  - Please call the office sooner if you have heavy vaginal bleeding, severe abdominal pain, fever over 100.4F, or are unable to urinate  - You can take ibuprofen 600mg and tylenol 975mg every 6 hours as needed for pain. Oxycodone should be used for severe pain only. Please do not drive or make important decisions if taking oxycodone.  - No vigorous activity and no lifting objects greater than 10lbs x 6weeks. You may ambulate and climb stairs.  - Nothing per vagina, no tub baths, and no soaking incision sites x 6weeks. You may shower daily, allowing soapy water to run over incision sites; Sutures will dissolve on their own.  - Constipation is a common complaint after surgery and straining should be avoided. Stool softeners (i.e. senna) and mild laxatives (i.e. miralax) can help with bowel regularity.

## 2024-02-24 NOTE — PROGRESS NOTE ADULT - ASSESSMENT
A/P:     Neuro: Pain well controlled. Continue current pain regimen.  CV: No history of cardiovascular disease. Blood pressure well controlled.  Pulm: No active disease. Saturating well on room air. Incentive spirometer use encouraged  GI: No active disease. Bowel sounds and function normal, tolerating PO diet. Continue current bowel regimen.   : Gold removed, voiding spontaneously.  Heme: Hgb _ -> AM labs pending  ID: Afebrile. No antibiotics indicated at this time.   Endo: No active disease.   FEN: IVF at _. Will discontinue IVF when tolerating PO. Electrolytes WNL. AM labs pending.   Skin: No active disease.   Psych: No active disease.   DVT ppx: Ambulation encouraged, SCDs when in bed, lovenox ordered.  Dispo: Pending labs and AM rounds.    A/P: 70 yo s/p Ex-LAP, TERENCE, BSO, appendectomy via maylard. Tap block.    Neuro: Pain well controlled. Continue current pain regimen.  CV: H/o HTN and HLD. Blood pressure well controlled on amlodipine. C/w olmesartan.  Pulm: Saturating well on room air. Incentive spirometer use encouraged  GI: No active disease. Bowel sounds and function normal, tolerating PO diet. Continue current bowel regimen.   : Gold removed, voiding spontaneously.  Heme: Hgb _ -> AM labs pending  ID: Afebrile. No antibiotics indicated at this time.   Endo: No active disease.   FEN: IVF at _. Will discontinue IVF when tolerating PO. Electrolytes WNL. AM labs pending.   Psych: H/o alcohol abuse.   DVT ppx: Ambulation encouraged, SCDs when in bed, heparin q8h.  Dispo: Pending labs and AM rounds.    A/P: 68 yo s/p Ex-LAP, TERENCE, BSO, appendectomy via maylard. Tap block.    Neuro: Pain well controlled. Continue current pain regimen.  CV: H/o HTN and HLD. Blood pressure well controlled on amlodipine. C/w olmesartan.  Pulm: Saturating well on room air. Incentive spirometer use encouraged  GI: Pending flatus. For regular diet. Continue current bowel regimen.   : Gold to be removed. Will f/u TOV.  Heme: Hgb 15.3 > AM labs pending  ID: Afebrile.   FEN: IVF at 100cc/h. Will discontinue IVF when tolerating PO. AM labs pending.   DVT ppx: Ambulation encouraged, SCDs when in bed, heparin q8h.  Dispo: C/w inpatient care. Will reassess tomorrow AM.

## 2024-02-25 LAB
BILIRUB SERPL-MCNC: 0.6 MG/DL — SIGNIFICANT CHANGE UP (ref 0.2–1.2)
CREAT SERPL-MCNC: 0.54 MG/DL — SIGNIFICANT CHANGE UP (ref 0.5–1.3)
EGFR: 100 ML/MIN/1.73M2 — SIGNIFICANT CHANGE UP
INR BLD: 1.05 RATIO — SIGNIFICANT CHANGE UP (ref 0.85–1.18)
MELD SCORE WITH DIALYSIS: 20 POINTS — SIGNIFICANT CHANGE UP
MELD SCORE WITHOUT DIALYSIS: 7 POINTS — SIGNIFICANT CHANGE UP
PROTHROM AB SERPL-ACNC: 11.8 SEC — SIGNIFICANT CHANGE UP (ref 9.5–13)
SODIUM SERPL-SCNC: 139 MMOL/L — SIGNIFICANT CHANGE UP (ref 135–145)

## 2024-02-25 RX ORDER — SIMETHICONE 80 MG/1
80 TABLET, CHEWABLE ORAL EVERY 6 HOURS
Refills: 0 | Status: DISCONTINUED | OUTPATIENT
Start: 2024-02-25 | End: 2024-02-26

## 2024-02-25 RX ADMIN — Medication 975 MILLIGRAM(S): at 17:33

## 2024-02-25 RX ADMIN — HEPARIN SODIUM 5000 UNIT(S): 5000 INJECTION INTRAVENOUS; SUBCUTANEOUS at 13:02

## 2024-02-25 RX ADMIN — HEPARIN SODIUM 5000 UNIT(S): 5000 INJECTION INTRAVENOUS; SUBCUTANEOUS at 05:46

## 2024-02-25 RX ADMIN — Medication 975 MILLIGRAM(S): at 13:01

## 2024-02-25 RX ADMIN — SIMETHICONE 80 MILLIGRAM(S): 80 TABLET, CHEWABLE ORAL at 17:32

## 2024-02-25 RX ADMIN — SIMETHICONE 80 MILLIGRAM(S): 80 TABLET, CHEWABLE ORAL at 23:33

## 2024-02-25 RX ADMIN — LOSARTAN POTASSIUM 50 MILLIGRAM(S): 100 TABLET, FILM COATED ORAL at 10:19

## 2024-02-25 RX ADMIN — AMLODIPINE BESYLATE 5 MILLIGRAM(S): 2.5 TABLET ORAL at 10:19

## 2024-02-25 RX ADMIN — POLYETHYLENE GLYCOL 3350 17 GRAM(S): 17 POWDER, FOR SOLUTION ORAL at 10:20

## 2024-02-25 RX ADMIN — HEPARIN SODIUM 5000 UNIT(S): 5000 INJECTION INTRAVENOUS; SUBCUTANEOUS at 21:17

## 2024-02-25 NOTE — PROGRESS NOTE ADULT - ASSESSMENT
A/P: 70 yo s/p Ex-LAP, TERENCE, BSO, appendectomy via maylard. Tap block. POD#2    Neuro: Pain well controlled. Continue current pain regimen.  CV: H/o HTN and HLD. Blood pressure well controlled on amlodipine. C/w olmesartan.  Pulm: Saturating well on room air. Incentive spirometer use encouraged  GI: Pending flatus. For regular diet. Continue current bowel regimen.   : Voiding, Cr 0.54  Heme: Hgb 15.3 > 13.9, stable  ID: Afebrile.   FEN: IVF off. Electrolytes wnl  DVT ppx: Ambulation encouraged, SCDs when in bed, heparin q8h.    Dispo: Anticipate discharge home today s/p attending rounds

## 2024-02-25 NOTE — PROGRESS NOTE ADULT - SUBJECTIVE AND OBJECTIVE BOX
GYNECOLOGIC ONCOLOGY PROGRESS NOTE    POD#2    PROBLEMS:  1. Pelvic mass  2. Fibroid uterus    Pt seen and examined at bedside.     SUBJECTIVE:    Tolerating PO, but low appetite. Denies nausea, vomiting.  Pain well-controlled.  - flatus, +voiding  Not yet attempted PO diet.  Ambulating.    OBJECTIVE:   Vital Signs Last 24 Hrs  T(C): 36.8 (25 Feb 2024 07:33), Max: 37.1 (25 Feb 2024 00:12)  T(F): 98.3 (25 Feb 2024 07:33), Max: 98.8 (25 Feb 2024 00:12)  HR: 84 (25 Feb 2024 07:33) (84 - 99)  BP: 139/82 (25 Feb 2024 07:33) (139/82 - 151/66)  BP(mean): 87 (24 Feb 2024 16:00) (87 - 87)  RR: 18 (25 Feb 2024 07:33) (17 - 18)  SpO2: 96% (25 Feb 2024 07:33) (95% - 99%)    Parameters below as of 25 Feb 2024 07:33  Patient On (Oxygen Delivery Method): room air    I&O's Detail    24 Feb 2024 07:01  -  25 Feb 2024 07:00  --------------------------------------------------------  IN:    Oral Fluid: 240 mL  Total IN: 240 mL    OUT:    Indwelling Catheter - Urethral (mL): 225 mL    Voided (mL): 450 mL  Total OUT: 675 mL    Total NET: -435 mL    MEDICATIONS  (STANDING):  acetaminophen     Tablet .. 975 milliGRAM(s) Oral every 6 hours  amLODIPine   Tablet 5 milliGRAM(s) Oral daily  heparin   Injectable 5000 Unit(s) SubCutaneous every 8 hours  losartan 50 milliGRAM(s) Oral daily  polyethylene glycol 3350 17 Gram(s) Oral daily    MEDICATIONS  (PRN):  ibuprofen  Tablet. 600 milliGRAM(s) Oral every 6 hours PRN Mild Pain (1 - 3)  ondansetron Injectable 4 milliGRAM(s) IV Push every 6 hours PRN Nausea and/or Vomiting  oxyCODONE    IR 5 milliGRAM(s) Oral every 4 hours PRN Moderate Pain (4 - 6)  oxyCODONE    IR 10 milliGRAM(s) Oral every 4 hours PRN Severe Pain (7 - 10)      Physical Exam:  Constitutional: NAD  Pulmonary: unlabored respirations  Abdomen: soft, non-tender, non-distended, normal bowel sounds  Extremities: no lower extremity edema or calve tenderness, Gonzales's sign negative.  Incision: Clean, dry, intact with dressing in place. Without signs of infection or hernia.    LABS:                          13.9   6.71  )-----------( 345      ( 24 Feb 2024 08:03 )             40.4     02-25    139  |  x   |  x   ----------------------------<  x   x    |  x   |  0.54    Ca    8.3<L>      24 Feb 2024 08:03  Phos  2.8     02-24  Mg     2.0     02-24    TPro  x   /  Alb  x   /  TBili  0.6  /  DBili  x   /  AST  x   /  ALT  x   /  AlkPhos  x   02-25

## 2024-02-26 ENCOUNTER — TRANSCRIPTION ENCOUNTER (OUTPATIENT)
Age: 69
End: 2024-02-26

## 2024-02-26 VITALS
HEART RATE: 87 BPM | OXYGEN SATURATION: 96 % | DIASTOLIC BLOOD PRESSURE: 76 MMHG | SYSTOLIC BLOOD PRESSURE: 111 MMHG | RESPIRATION RATE: 16 BRPM | TEMPERATURE: 98 F

## 2024-02-26 LAB
ANION GAP SERPL CALC-SCNC: 5 MMOL/L — SIGNIFICANT CHANGE UP (ref 5–17)
BASOPHILS # BLD AUTO: 0.03 K/UL — SIGNIFICANT CHANGE UP (ref 0–0.2)
BASOPHILS NFR BLD AUTO: 0.5 % — SIGNIFICANT CHANGE UP (ref 0–2)
BUN SERPL-MCNC: 13 MG/DL — SIGNIFICANT CHANGE UP (ref 7–23)
CALCIUM SERPL-MCNC: 9.8 MG/DL — SIGNIFICANT CHANGE UP (ref 8.5–10.1)
CHLORIDE SERPL-SCNC: 107 MMOL/L — SIGNIFICANT CHANGE UP (ref 96–108)
CO2 SERPL-SCNC: 25 MMOL/L — SIGNIFICANT CHANGE UP (ref 22–31)
CREAT SERPL-MCNC: 0.72 MG/DL — SIGNIFICANT CHANGE UP (ref 0.5–1.3)
EGFR: 91 ML/MIN/1.73M2 — SIGNIFICANT CHANGE UP
EOSINOPHIL # BLD AUTO: 0.07 K/UL — SIGNIFICANT CHANGE UP (ref 0–0.5)
EOSINOPHIL NFR BLD AUTO: 1.1 % — SIGNIFICANT CHANGE UP (ref 0–6)
GLUCOSE SERPL-MCNC: 115 MG/DL — HIGH (ref 70–99)
HCT VFR BLD CALC: 40 % — SIGNIFICANT CHANGE UP (ref 34.5–45)
HGB BLD-MCNC: 14 G/DL — SIGNIFICANT CHANGE UP (ref 11.5–15.5)
IMM GRANULOCYTES NFR BLD AUTO: 0.2 % — SIGNIFICANT CHANGE UP (ref 0–0.9)
LYMPHOCYTES # BLD AUTO: 1.55 K/UL — SIGNIFICANT CHANGE UP (ref 1–3.3)
LYMPHOCYTES # BLD AUTO: 25.4 % — SIGNIFICANT CHANGE UP (ref 13–44)
MCHC RBC-ENTMCNC: 32.9 PG — SIGNIFICANT CHANGE UP (ref 27–34)
MCHC RBC-ENTMCNC: 35 GM/DL — SIGNIFICANT CHANGE UP (ref 32–36)
MCV RBC AUTO: 93.9 FL — SIGNIFICANT CHANGE UP (ref 80–100)
MONOCYTES # BLD AUTO: 0.59 K/UL — SIGNIFICANT CHANGE UP (ref 0–0.9)
MONOCYTES NFR BLD AUTO: 9.7 % — SIGNIFICANT CHANGE UP (ref 2–14)
NEUTROPHILS # BLD AUTO: 3.85 K/UL — SIGNIFICANT CHANGE UP (ref 1.8–7.4)
NEUTROPHILS NFR BLD AUTO: 63.1 % — SIGNIFICANT CHANGE UP (ref 43–77)
PLATELET # BLD AUTO: 358 K/UL — SIGNIFICANT CHANGE UP (ref 150–400)
POTASSIUM SERPL-MCNC: 3.6 MMOL/L — SIGNIFICANT CHANGE UP (ref 3.5–5.3)
POTASSIUM SERPL-SCNC: 3.6 MMOL/L — SIGNIFICANT CHANGE UP (ref 3.5–5.3)
RBC # BLD: 4.26 M/UL — SIGNIFICANT CHANGE UP (ref 3.8–5.2)
RBC # FLD: 12.8 % — SIGNIFICANT CHANGE UP (ref 10.3–14.5)
SODIUM SERPL-SCNC: 137 MMOL/L — SIGNIFICANT CHANGE UP (ref 135–145)
WBC # BLD: 6.1 K/UL — SIGNIFICANT CHANGE UP (ref 3.8–10.5)
WBC # FLD AUTO: 6.1 K/UL — SIGNIFICANT CHANGE UP (ref 3.8–10.5)

## 2024-02-26 RX ADMIN — Medication 975 MILLIGRAM(S): at 06:22

## 2024-02-26 RX ADMIN — Medication 975 MILLIGRAM(S): at 11:39

## 2024-02-26 RX ADMIN — POLYETHYLENE GLYCOL 3350 17 GRAM(S): 17 POWDER, FOR SOLUTION ORAL at 11:39

## 2024-02-26 RX ADMIN — SIMETHICONE 80 MILLIGRAM(S): 80 TABLET, CHEWABLE ORAL at 06:22

## 2024-02-26 RX ADMIN — SIMETHICONE 80 MILLIGRAM(S): 80 TABLET, CHEWABLE ORAL at 11:40

## 2024-02-26 RX ADMIN — HEPARIN SODIUM 5000 UNIT(S): 5000 INJECTION INTRAVENOUS; SUBCUTANEOUS at 06:23

## 2024-02-26 NOTE — PROGRESS NOTE ADULT - SUBJECTIVE AND OBJECTIVE BOX
GYNECOLOGIC ONCOLOGY PROGRESS NOTE    POD#3    PROBLEMS:  1. Pelvic mass  2. Fibroid uterus    Pt seen and examined at bedside.     SUBJECTIVE:  Patient seen at bedside. States feeling much better this AM.   Tolerating PO; denies nausea, vomiting.  Pain controlled on PO pain regimen.   + flatus/ -BM/ +voiding   Ambulating.  Denies fevers, chills, CP, SOB. Denies vaginal bleeding.         OBJECTIVE:   Vital Signs Last 24 Hrs  T(C): 36.7 (26 Feb 2024 00:18), Max: 36.8 (25 Feb 2024 07:33)  T(F): 98.1 (26 Feb 2024 00:18), Max: 98.3 (25 Feb 2024 07:33)  HR: 84 (26 Feb 2024 00:18) (84 - 96)  BP: 118/80 (26 Feb 2024 00:18) (101/59 - 139/82)  BP(mean): --  RR: 17 (26 Feb 2024 00:18) (17 - 18)  SpO2: 94% (26 Feb 2024 00:18) (94% - 98%)    Parameters below as of 26 Feb 2024 00:18  Patient On (Oxygen Delivery Method): room air      Physical Exam:  Constitutional: NAD  CV: RRR  Pulm: CTAB  Abdomen: soft, non-tender, non-distended, normal bowel sounds  Incision: Clean, dry, intact with dressing in place. Without signs of infection or hernia.  Extremities: no lower extremity edema or calve tenderness, Gonzales's sign negative.        LABS:                                     13.9   6.71  )-----------( 345      ( 24 Feb 2024 08:03 )             40.4     02-25    139  |  x   |  x   ----------------------------<  x   x    |  x   |  0.54    Ca    8.3<L>      24 Feb 2024 08:03  Phos  2.8     02-24  Mg     2.0     02-24    TPro  x   /  Alb  x   /  TBili  0.6  /  DBili  x   /  AST  x   /  ALT  x   /  AlkPhos  x   02-25      MEDICATIONS  (STANDING):  acetaminophen     Tablet .. 975 milliGRAM(s) Oral every 6 hours  amLODIPine   Tablet 5 milliGRAM(s) Oral daily  heparin   Injectable 5000 Unit(s) SubCutaneous every 8 hours  losartan 50 milliGRAM(s) Oral daily  polyethylene glycol 3350 17 Gram(s) Oral daily  simethicone 80 milliGRAM(s) Chew every 6 hours    MEDICATIONS  (PRN):  ibuprofen  Tablet. 600 milliGRAM(s) Oral every 6 hours PRN Mild Pain (1 - 3)  ondansetron Injectable 4 milliGRAM(s) IV Push every 6 hours PRN Nausea and/or Vomiting  oxyCODONE    IR 5 milliGRAM(s) Oral every 4 hours PRN Moderate Pain (4 - 6)  oxyCODONE    IR 10 milliGRAM(s) Oral every 4 hours PRN Severe Pain (7 - 10)   GYNECOLOGIC ONCOLOGY PROGRESS NOTE    POD#3    PROBLEMS:  1. Pelvic mass  2. Fibroid uterus    Pt seen and examined at bedside.     SUBJECTIVE:  Patient seen at bedside. States feeling much better this AM.   Tolerating PO; denies nausea, vomiting.  Pain controlled on PO pain regimen.   + flatus/ -BM/ +voiding   Ambulating.  Denies fevers, chills, CP, SOB. Denies vaginal bleeding.         OBJECTIVE:   Vital Signs Last 24 Hrs  T(C): 36.7 (26 Feb 2024 00:18), Max: 36.8 (25 Feb 2024 07:33)  T(F): 98.1 (26 Feb 2024 00:18), Max: 98.3 (25 Feb 2024 07:33)  HR: 84 (26 Feb 2024 00:18) (84 - 96)  BP: 118/80 (26 Feb 2024 00:18) (101/59 - 139/82)  BP(mean): --  RR: 17 (26 Feb 2024 00:18) (17 - 18)  SpO2: 94% (26 Feb 2024 00:18) (94% - 98%)    Parameters below as of 26 Feb 2024 00:18  Patient On (Oxygen Delivery Method): room air      Physical Exam:  Constitutional: NAD  CV: RRR  Pulm: CTAB  Abdomen: soft, non-distended, appropriately tender to palpation; normal bowel sounds; no rebound or rigidity   Incision: Clean, dry, intact with dressing in place. Without signs of infection or hernia.  Extremities: no lower extremity edema or calve tenderness, Goznales's sign negative.        LABS:                                     13.9   6.71  )-----------( 345      ( 24 Feb 2024 08:03 )             40.4     02-25    139  |  x   |  x   ----------------------------<  x   x    |  x   |  0.54    Ca    8.3<L>      24 Feb 2024 08:03  Phos  2.8     02-24  Mg     2.0     02-24    TPro  x   /  Alb  x   /  TBili  0.6  /  DBili  x   /  AST  x   /  ALT  x   /  AlkPhos  x   02-25      MEDICATIONS  (STANDING):  acetaminophen     Tablet .. 975 milliGRAM(s) Oral every 6 hours  amLODIPine   Tablet 5 milliGRAM(s) Oral daily  heparin   Injectable 5000 Unit(s) SubCutaneous every 8 hours  losartan 50 milliGRAM(s) Oral daily  polyethylene glycol 3350 17 Gram(s) Oral daily  simethicone 80 milliGRAM(s) Chew every 6 hours    MEDICATIONS  (PRN):  ibuprofen  Tablet. 600 milliGRAM(s) Oral every 6 hours PRN Mild Pain (1 - 3)  ondansetron Injectable 4 milliGRAM(s) IV Push every 6 hours PRN Nausea and/or Vomiting  oxyCODONE    IR 5 milliGRAM(s) Oral every 4 hours PRN Moderate Pain (4 - 6)  oxyCODONE    IR 10 milliGRAM(s) Oral every 4 hours PRN Severe Pain (7 - 10)

## 2024-02-26 NOTE — PROGRESS NOTE ADULT - ASSESSMENT
A/P: 70 yo s/p Ex-LAP, TERENCE, BSO, appendectomy via [place]. Tap block. POD#3    Neuro: Pain well controlled. Continue current pain regimen.  CV: H/o HTN and HLD. Blood pressure well controlled on amlodipine. C/w olmesartan.  Pulm: Saturating well on room air. Incentive spirometer use encouraged  GI: Tolerating PO diet. Bowel sounds and function normal. Continue current bowel regimen.   : Voiding spontaneously, Cr 0.54  Heme: Hgb 15.3 > 13.9 > AM labs pending  ID: Afebrile.   FEN: Replete electrolytes prn.   DVT ppx: Ambulation encouraged, SCDs when in bed, heparin q8h.    Dispo: Anticipate discharge home today pending AM labs.

## 2024-02-26 NOTE — DISCHARGE NOTE NURSING/CASE MANAGEMENT/SOCIAL WORK - NSDCPEFALRISK_GEN_ALL_CORE
For information on Fall & Injury Prevention, visit: [email or web address] OR  [email or web address] OR  https://www.cdc.gov/steadi/patient.html

## 2024-02-26 NOTE — DISCHARGE NOTE NURSING/CASE MANAGEMENT/SOCIAL WORK - PATIENT PORTAL LINK FT
You can access the FollowMyHealth Patient Portal offered by [facility] by registering at the following website: [email or web address]. By joining [employer]’s FollowMyHealth portal, you will also be able to view your health information using other applications (apps) compatible with our system.

## 2024-02-26 NOTE — PROGRESS NOTE ADULT - ATTENDING COMMENTS
Fellow Attestation: patient seen and examined during morning rounds. Overall notes improvement in appetite, resolved nausea and endorses positive flatus. Ambulating with out feeling lightheaded, dizziness or chest pain/dyspnea. Mild distended abdominal exam improved from yesterday, bowel sounds present and soft area in  upper abdomen away from incision near tap block location with small palpable hematoma. Bandage removed and incision c/d/i with out signs of hematoma. BP noted to be soft in AM, however CBC and repeat BP improved with out any symptoms per pt. Plan: Wound care, precautions on bleeding/infection and low BP reviewed with pt. If feeling symptomatic or BP low plan to call office otherwise plan to follow up with PCP for evaluation of titration of BP medications. Patient voiced understanding and cleared for discharge. D/w Dr. Christopher

## 2024-02-27 PROBLEM — I44.7 LEFT BUNDLE-BRANCH BLOCK, UNSPECIFIED: Chronic | Status: ACTIVE | Noted: 2024-02-12

## 2024-02-27 PROBLEM — R19.00 INTRA-ABDOMINAL AND PELVIC SWELLING, MASS AND LUMP, UNSPECIFIED SITE: Chronic | Status: ACTIVE | Noted: 2024-02-12

## 2024-02-29 ENCOUNTER — NON-APPOINTMENT (OUTPATIENT)
Age: 69
End: 2024-02-29

## 2024-03-01 LAB — SURGICAL PATHOLOGY STUDY: SIGNIFICANT CHANGE UP

## 2024-03-04 NOTE — CDI QUERY NOTE - NSCDIOTHERTXTBX_GEN_ALL_CORE_HH
Further clarification is required regarding pathology findings. No documentation addressing the pathology findings were found in the medical record at the time of this review.    Please clarify after further study, evaluation, and treatment if you concur with the Pathology findings.    - Concur with Pathology Findings Noting: - Cervix: Chronic cervicitis, focally moderate. Numerous nabothian cysts and tunnel clusters.   - Uterus: Endometrial atrophy with a few glandular cysts. Leiomyoma, subserosal, with large regions of degenerative   change, focally cystic and hemorrhagic. Acute focal appendicitis   - Do Not Concur with Pathology Findings  - Other Please Specify  - Not Clinically Significant    Chart Documentation    Brief Operative note 2/23/2024  Operative findings - Normal appearing external genitalia. Large approximately 18wk size very mobile uterus on bimanual. Abdominal findings noted globular, soft, enlarged uterus. Normal appearing bilateral adnexa. Minimal scarring/adhesions from prior surgeries. Appendix normal in appearance other than a dilated distal end of approximate 1.5cm. Uterus opened off the table and cystic degeneration noted with multiple fluid pockets.     Discharge provider note 2/24/2024   Fibroid uterus     Pathology Findings:    Patient: PAIGE GRAY   Accession: [id number]   Collected Date/Time: 2/23/2024 15:45 EST   Received Date/Time: 2/26/2024 07:58 EST   Surgical Pathology Report - Auth (Verified)   Specimen(s) Submitted   Final Diagnosis   1. Uterus, cervix, and bilateral adnexa, TERENCE-BSO, 1550 g):   - Cervix: Chronic cervicitis, focally moderate. Numerous nabothian cysts and tunnel clusters.   - Uterus: Endometrial atrophy with a few glandular cysts. Leiomyoma, subserosal, with large regions of degenerative   change, focally cystic and hemorrhagic.   2. Appendix: Mucocele with associated reactive mucosal hyperplasia. Focal acute appendicitis.

## 2024-03-12 DIAGNOSIS — D25.2 SUBSEROSAL LEIOMYOMA OF UTERUS: ICD-10-CM

## 2024-03-12 DIAGNOSIS — K35.80 UNSPECIFIED ACUTE APPENDICITIS: ICD-10-CM

## 2024-03-12 DIAGNOSIS — N72 INFLAMMATORY DISEASE OF CERVIX UTERI: ICD-10-CM

## 2024-03-12 DIAGNOSIS — N88.8 OTHER SPECIFIED NONINFLAMMATORY DISORDERS OF CERVIX UTERI: ICD-10-CM

## 2024-03-12 NOTE — CHART NOTE - NSCHARTNOTEFT_GEN_A_CORE
This chart note is written to confirm the final pathologic diagnosis which is benign as follows.    Uterus, cervix, and bilateral adnexa, TERENCE-BSO, 1550 g):  -   Chronic cervicitis, focally moderate.  Numerous nabothian cysts and tunnel clusters.  Endometrial atrophy with a few glandular cysts.  Leiomyoma, subserosal, with large regions of degenerative change, focally cystic and hemorrhagic.  Rare benign serous inclusions in ovaries.  Bilateral fallopian tubes normal.    Appendix: Mucocele with associated reactive mucosal hyperplasia; focal acute appendicitis; no evidence of dysplasia.
GYNECOLOGIC ONCOLOGY PROGRESS NOTE    POD#0    Pt seen and examined at bedside.     SUBJECTIVE:    Patient is without complaints.  Pain well-controlled.  Flatus: denies.  Denies Nausea, Vomiting or Diarrhea.  Denies shortness of breath, chest pain or dyspnea on exertion.  Tolerating sips of water and juice.  Gold in place.    OBJECTIVE:     VITALS:  T(F): 97.3 (02-23-24 @ 23:08), Max: 99 (02-23-24 @ 19:05)  HR: 93 (02-23-24 @ 23:08) (56 - 95)  BP: 139/81 (02-23-24 @ 23:08) (121/68 - 146/91)  RR: 16 (02-23-24 @ 23:08) (11 - 19)  SpO2: 94% (02-23-24 @ 23:08) (93% - 99%)    I&O's Summary    23 Feb 2024 07:01  -  23 Feb 2024 23:30  --------------------------------------------------------  IN: 2053 mL / OUT: 450 mL / NET: 1603 mL        MEDICATIONS  (STANDING):  acetaminophen     Tablet .. 975 milliGRAM(s) Oral every 6 hours  amLODIPine   Tablet 5 milliGRAM(s) Oral daily  ceFAZolin  Injectable. 2000 milliGRAM(s) IV Push once  heparin   Injectable 5000 Unit(s) SubCutaneous every 8 hours  lactated ringers. 1000 milliLiter(s) (104 mL/Hr) IV Continuous <Continuous>  losartan 50 milliGRAM(s) Oral daily  metroNIDAZOLE  IVPB 500 milliGRAM(s) IV Intermittent once    MEDICATIONS  (PRN):  ibuprofen  Tablet. 600 milliGRAM(s) Oral every 6 hours PRN Mild Pain (1 - 3)  ondansetron  IVPB 4 milliGRAM(s) IV Intermittent every 6 hours PRN Nausea and/or Vomiting  oxyCODONE    IR 5 milliGRAM(s) Oral every 4 hours PRN Moderate Pain (4 - 6)  oxyCODONE    IR 10 milliGRAM(s) Oral every 4 hours PRN Severe Pain (7 - 10)      Physical Exam:  Constitutional: NAD  Pulmonary: clear to auscultation bilaterally   Cardiovascular: Regular rate and rhythm   Abdomen: soft, non-tender, non-distended, normal bowel sounds  Extremities: no lower extremity edema or calve tenderness  Incision: Clean, dry, intact with bandage in place.  Without signs of infection or hernia.    69y F POD#0 s/p TERENCE, BSO, and appendectomy.    A/P:   Neuro: Pain well controlled. Continue current pain regimen.  CV: History of hypertension. Blood pressure well controlled. Home meds ordered.  Pulm: No active disease. Saturating well on room air. Incentive spirometer use encouraged  GI: No active disease. Bowel sounds and function normal, tolerating PO diet. Continue current bowel regimen.   : Gold in place. To be removed with TOV in AM.  Heme: Hgb 15.3 -> AM labs pending  ID: Afebrile. No antibiotics indicated at this time.   Endo: No active disease.   FEN: IVF at 104mL/hr. Will discontinue IVF when tolerating PO. Electrolytes WNL. AM labs pending.   Skin: No active disease.   Psych: No active disease.   DVT ppx: Ambulation encouraged, SCDs when in bed, heparin ordered.    Dispo: Continue with inpatient management

## 2024-03-20 ENCOUNTER — APPOINTMENT (OUTPATIENT)
Dept: GYNECOLOGIC ONCOLOGY | Facility: CLINIC | Age: 69
End: 2024-03-20
Payer: MEDICARE

## 2024-03-20 VITALS
TEMPERATURE: 98.2 F | OXYGEN SATURATION: 99 % | BODY MASS INDEX: 26.56 KG/M2 | SYSTOLIC BLOOD PRESSURE: 160 MMHG | RESPIRATION RATE: 16 BRPM | DIASTOLIC BLOOD PRESSURE: 82 MMHG | HEIGHT: 62 IN | HEART RATE: 81 BPM | WEIGHT: 144.31 LBS

## 2024-03-20 DIAGNOSIS — D25.9 LEIOMYOMA OF UTERUS, UNSPECIFIED: ICD-10-CM

## 2024-03-20 DIAGNOSIS — K37 UNSPECIFIED APPENDICITIS: ICD-10-CM

## 2024-03-20 PROBLEM — C44.91 BASAL CELL CARCINOMA OF SKIN, UNSPECIFIED: Chronic | Status: ACTIVE | Noted: 2024-02-12

## 2024-03-20 PROCEDURE — 99024 POSTOP FOLLOW-UP VISIT: CPT

## 2024-04-05 PROBLEM — K37 APPENDICITIS, UNSPECIFIED APPENDICITIS TYPE: Status: ACTIVE | Noted: 2024-01-17

## 2024-04-05 PROBLEM — D25.9 FIBROID UTERUS: Status: ACTIVE | Noted: 2024-01-19

## 2024-04-05 NOTE — PLAN
[TextEntry] : Resume routine well woman care with primary GYN. Return in 3 weeks - incision check alberto TRINIDAD

## 2024-04-05 NOTE — REASON FOR VISIT
[Post Op] : post op visit [de-identified] : 2/23/24 [de-identified] : TERENCE BSO, appendectomy for uterine fundal mass, and history of appendicitis at [facility] [de-identified] : The patient is healing remarkably well. She denies a change in appetite, or a change in weight. She is tolerating PO without nausea or vomiting. She denies VB/VD. She denies CP/SOB. Had some bleeding starting about 1 week after surgery, which lasted about 1 week. No further bleeding.

## 2024-04-05 NOTE — ASSESSMENT
[FreeTextEntry1] : The patient is healing well without complication. We discussed ongoing recuperation and reviewed final pathology results in detail - a copy was provided to the patient. Restrictions include no heavy lifting & pelvic rest x 6-8 weeks from surgery.  Patient may resume cardio, but should avoid any core-strengthening exercises x 6 weeks from surgery. We reviewed the need for ongoing GYN visits and a recommended plan to follow up with regular GYN. The patient stated and expressed a good understanding of the above information.  Some folliculitis at inferior aspect of incision noted. Return in about 3 weeks for incision check with PA. If redness is resolved over time, may cancel appt.

## 2024-04-05 NOTE — END OF VISIT
[FreeTextEntry3] : Written by Shira Pitts, acting as a scribe for Dr. Samira Fatima. This note accurately reflects the work and decisions made by me.

## 2024-04-05 NOTE — DISCUSSION/SUMMARY
[Clean] : was clean [Dry] : was dry [Intact] : was intact [None] : had no drainage [Normal Skin] : normal appearance [Erythema] : was not erythematous [Ecchymosis] : was not ecchymotic [Seroma] : had no seroma [Firm] : soft [Tender] : nontender [Abnormal Bowel Sounds] : normal bowel sounds [Rebound] : no rebound tenderness [Guarding] : no guarding [Incisional Hernia] : no incisional hernia [Mass] : no palpable mass [Doing Well] : is doing well [FreeTextEntry1] : OPERATIVE FINDINGS: Normal external genitalia, vagina, and cervix. Uterus 15-week size with fundal large leiomyoma, small cervix, bilateral fallopian tubes and ovaries normal. Appendix with distal thickening of the appendiceal tip, otherwise normal. Normal palpated upper abdomen. Cystic degeneration noticed within the fibroid upon sectioning off the surgical field.  PATHOLOGY: Specimen(s) Submitted 1  Uterus, cervix, bilateral fallopian tubes and ovaries 1550 grams 2  Appendix  Final Diagnosis 1.  Uterus, cervix, and bilateral adnexa, TERENCE-BSO, 1550 g): -   Cervix: -   Chronic cervicitis, focally moderate.  -   Numerous nabothian cysts and tunnel clusters.  -   Uterus: -   Endometrial atrophy with a few glandular cysts. -   Leiomyoma, subserosal, with large regions of degenerative change, focally cystic                 and hemorrhagic.  -   Ovaries, bilateral: -   Rare benign serous inclusions.  -   Fallopian tubes, bilateral: -   No significant histologic abnormality.  2.  Appendix: -   Mucocele with associated reactive mucosal hyperplasia. -   Focal acute appendicitis. -   No evidence of dysplasia.

## 2024-04-10 ENCOUNTER — APPOINTMENT (OUTPATIENT)
Dept: GYNECOLOGIC ONCOLOGY | Facility: CLINIC | Age: 69
End: 2024-04-10
Payer: MEDICARE

## 2024-04-10 VITALS
RESPIRATION RATE: 16 BRPM | HEART RATE: 71 BPM | OXYGEN SATURATION: 99 % | DIASTOLIC BLOOD PRESSURE: 82 MMHG | WEIGHT: 144 LBS | SYSTOLIC BLOOD PRESSURE: 132 MMHG | HEIGHT: 62 IN | TEMPERATURE: 98.7 F | BODY MASS INDEX: 26.5 KG/M2

## 2024-04-10 DIAGNOSIS — Z09 ENCOUNTER FOR FOLLOW-UP EXAMINATION AFTER COMPLETED TREATMENT FOR CONDITIONS OTHER THAN MALIGNANT NEOPLASM: ICD-10-CM

## 2024-04-10 PROCEDURE — 99024 POSTOP FOLLOW-UP VISIT: CPT

## 2024-04-10 NOTE — REASON FOR VISIT
[Other ___] : [unfilled] [de-identified] : 2/23/24 [de-identified] : TERENCE BSO, appendectomy for uterine fundal mass, and history of appendicitis at [facility] [place] [de-identified] : Patient returns today for 3 week re-evaluation of incision, noted to have folliculitis at inferior portion when last seen for POA on 3/20/24. She reports being fearful of looking at the incision, she has been letting soap and water run over incision in the shower but has not been thoroughly cleaning area daily. Patient remains to recover well from procedure otherwise and is without any new complaints today. She denies fevers, is tolerating a regular diet and denies issues with bowel/bladder habits.

## 2024-04-10 NOTE — ASSESSMENT
[FreeTextEntry1] : 70yo s/p procedure above, recovering well. She returns today for 3 weeks re-evaluation of incision due to folliculitis noted at inferior portion of incision at POA on 3/20.   I discussed with pt the importance of cleansing area 2x a day. Pt has anti-septic at home that she agrees to apply with 4x4's. Also advised to scrub with antibacterial soap daily. Patient to purchase medi-honey and apply twice daily to open area. She understands importance of applying abdominal pad under pannus to keep area dry as often as possible to prevent infection. Pt lives with her , pt can also have her  help if willing as she is worried looking at incision will make her queezy. She will call right away with any further concerns.

## 2024-04-10 NOTE — DISCUSSION/SUMMARY
[Intact] : was intact [None] : had no drainage [Normal Skin] : normal appearance [Doing Well] : is doing well [Clean] : was not clean [Dry] : was not dry [Erythema] : was not erythematous [Ecchymosis] : was not ecchymotic [Seroma] : had no seroma [Firm] : soft [Tender] : nontender [Abnormal Bowel Sounds] : normal bowel sounds [Rebound] : no rebound tenderness [Guarding] : no guarding [Incisional Hernia] : no incisional hernia [Mass] : no palpable mass [FreeTextEntry1] : OPERATIVE FINDINGS: Normal external genitalia, vagina, and cervix. Uterus 15-week size with fundal large leiomyoma, small cervix, bilateral fallopian tubes and ovaries normal. Appendix with distal thickening of the appendiceal tip, otherwise normal. Normal palpated upper abdomen. Cystic degeneration noticed within the fibroid upon sectioning off the surgical field.  PATHOLOGY: Specimen(s) Submitted 1  Uterus, cervix, bilateral fallopian tubes and ovaries 1550 grams 2  Appendix  Final Diagnosis 1.  Uterus, cervix, and bilateral adnexa, TERENCE-BSO, 1550 g): -   Cervix: -   Chronic cervicitis, focally moderate.  -   Numerous nabothian cysts and tunnel clusters.  -   Uterus: -   Endometrial atrophy with a few glandular cysts. -   Leiomyoma, subserosal, with large regions of degenerative change, focally cystic                 and hemorrhagic.  -   Ovaries, bilateral: -   Rare benign serous inclusions.  -   Fallopian tubes, bilateral: -   No significant histologic abnormality.  2.  Appendix: -   Mucocele with associated reactive mucosal hyperplasia. -   Focal acute appendicitis. -   No evidence of dysplasia.

## 2024-04-15 ENCOUNTER — OFFICE (OUTPATIENT)
Dept: URBAN - METROPOLITAN AREA CLINIC 109 | Facility: CLINIC | Age: 69
Setting detail: OPHTHALMOLOGY
End: 2024-04-15
Payer: MEDICARE

## 2024-04-15 DIAGNOSIS — H26.491: ICD-10-CM

## 2024-04-15 DIAGNOSIS — H16.223: ICD-10-CM

## 2024-04-15 DIAGNOSIS — H25.12: ICD-10-CM

## 2024-04-15 DIAGNOSIS — H35.54: ICD-10-CM

## 2024-04-15 PROCEDURE — 92014 COMPRE OPH EXAM EST PT 1/>: CPT | Performed by: OPHTHALMOLOGY

## 2024-04-15 PROCEDURE — 92134 CPTRZ OPH DX IMG PST SGM RTA: CPT | Performed by: OPHTHALMOLOGY

## 2024-04-30 ENCOUNTER — APPOINTMENT (OUTPATIENT)
Dept: GYNECOLOGIC ONCOLOGY | Facility: CLINIC | Age: 69
End: 2024-04-30
Payer: MEDICARE

## 2024-04-30 VITALS
OXYGEN SATURATION: 99 % | HEIGHT: 62 IN | HEART RATE: 78 BPM | DIASTOLIC BLOOD PRESSURE: 86 MMHG | RESPIRATION RATE: 16 BRPM | WEIGHT: 144 LBS | BODY MASS INDEX: 26.5 KG/M2 | SYSTOLIC BLOOD PRESSURE: 134 MMHG

## 2024-04-30 DIAGNOSIS — Z51.89 ENCOUNTER FOR OTHER SPECIFIED AFTERCARE: ICD-10-CM

## 2024-04-30 DIAGNOSIS — Z09 ENCOUNTER FOR FOLLOW-UP EXAMINATION AFTER COMPLETED TREATMENT FOR CONDITIONS OTHER THAN MALIGNANT NEOPLASM: ICD-10-CM

## 2024-04-30 PROCEDURE — 99024 POSTOP FOLLOW-UP VISIT: CPT

## 2024-04-30 NOTE — DISCUSSION/SUMMARY
[Clean] : was clean [Dry] : was dry [Intact] : was intact [None] : had no drainage 0 [Normal Skin] : normal appearance [Doing Well] : is doing well [Excellent Pain Control] : has excellent pain control [No Sign of Infection] : is showing no signs of infection [FreeTextEntry1] : Pfannenstiel incision well healed no area of folliculitis noted, no redness, drainage.  [TextEntry] : Cardiac: RRR, Normal S1/S2 Pulm CTABL

## 2024-04-30 NOTE — PLAN
[TextEntry] : Resume care with primary GYN annually Call office with any questions/concerns Continue with keeping area clean/dry

## 2024-04-30 NOTE — REASON FOR VISIT
[Post Op] : post op visit [Other ___] : [unfilled] [de-identified] : 2/23/24 [de-identified] : TERENCE BSO appendectomy [de-identified] : Patient presents for 2 week recheck of incision. She reports keeping the area clean and dry, using anti-septic that she got while admitted. She is also applying medi-honey to the incision. She reports soreness with increased activities, she has returned back to the gym and did a lot of gardening this past weekend. Overall reports improvement of discomfort but has not looked at her incision as it skeeves her out. Denies CP/SOB, fevers, chills, N/V.

## 2024-04-30 NOTE — ASSESSMENT
[FreeTextEntry1] : 68 yo female s/p TERENCE BSO appendectomy 2/23/24 complicated by folliculitis of incision, now with completely well healed incision without signs of folliculitis or infection.

## 2024-12-25 PROBLEM — F10.90 ALCOHOL USE: Status: ACTIVE | Noted: 2017-03-29

## 2025-04-21 ENCOUNTER — OFFICE (OUTPATIENT)
Dept: URBAN - METROPOLITAN AREA CLINIC 109 | Facility: CLINIC | Age: 70
Setting detail: OPHTHALMOLOGY
End: 2025-04-21
Payer: MEDICARE

## 2025-04-21 DIAGNOSIS — H25.12: ICD-10-CM

## 2025-04-21 DIAGNOSIS — H26.491: ICD-10-CM

## 2025-04-21 DIAGNOSIS — H35.54: ICD-10-CM

## 2025-04-21 DIAGNOSIS — H16.223: ICD-10-CM

## 2025-04-21 PROCEDURE — 92014 COMPRE OPH EXAM EST PT 1/>: CPT | Performed by: OPHTHALMOLOGY

## 2025-04-21 PROCEDURE — 92134 CPTRZ OPH DX IMG PST SGM RTA: CPT | Performed by: OPHTHALMOLOGY

## 2025-04-21 ASSESSMENT — REFRACTION_CURRENTRX
OS_AXIS: 125
OS_OVR_VA: 20/
OS_AXIS: 95
OS_SPHERE: -2.25
OD_CYLINDER: -1.00
OD_OVR_VA: 20/
OS_SPHERE: -1.50
OD_AXIS: 21
OD_AXIS: 155
OD_SPHERE: -2.50
OD_OVR_VA: 20/
OS_CYLINDER: -0.25
OS_CYLINDER: -0.75
OD_CYLINDER: -0.50
OS_OVR_VA: 20/
OD_SPHERE: -1.25

## 2025-04-21 ASSESSMENT — REFRACTION_AUTOREFRACTION
OS_SPHERE: -0.75
OS_AXIS: 146
OD_CYLINDER: -0.25
OS_CYLINDER: -0.50
OD_AXIS: 134
OD_SPHERE: +0.25

## 2025-04-21 ASSESSMENT — KERATOMETRY
OD_K1POWER_DIOPTERS: 43.25
OS_K1POWER_DIOPTERS: 43.25
OD_K2POWER_DIOPTERS: 43.75
OS_K2POWER_DIOPTERS: 43.75
OS_AXISANGLE_DEGREES: 024
OD_AXISANGLE_DEGREES: 073

## 2025-04-21 ASSESSMENT — CONFRONTATIONAL VISUAL FIELD TEST (CVF)
OS_FINDINGS: FULL
OD_FINDINGS: FULL

## 2025-04-21 ASSESSMENT — REFRACTION_MANIFEST
OS_SPHERE: -0.25
OD_SPHERE: -2.50
OD_SPHERE: -2.00
OS_ADD: +2.50
OS_AXIS: 89
OD_ADD: +2.50
OD_CYLINDER: -0.50
OD_VA1: 20/NI
OD_CYLINDER: -0.50
OS_CYLINDER: -1.25
OD_AXIS: 18
OD_AXIS: 155

## 2025-04-21 ASSESSMENT — SUPERFICIAL PUNCTATE KERATITIS (SPK)
OS_SPK: 1+
OD_SPK: 1+

## 2025-04-21 ASSESSMENT — TONOMETRY
OD_IOP_MMHG: 15
OS_IOP_MMHG: 15

## 2025-04-21 ASSESSMENT — VISUAL ACUITY
OD_BCVA: 20/25
OS_BCVA: 20/20